# Patient Record
Sex: FEMALE | Race: ASIAN | Employment: FULL TIME | URBAN - METROPOLITAN AREA
[De-identification: names, ages, dates, MRNs, and addresses within clinical notes are randomized per-mention and may not be internally consistent; named-entity substitution may affect disease eponyms.]

---

## 2017-02-01 ENCOUNTER — OFFICE VISIT (OUTPATIENT)
Dept: INTERNAL MEDICINE CLINIC | Facility: CLINIC | Age: 32
End: 2017-02-01

## 2017-02-01 VITALS
TEMPERATURE: 99 F | DIASTOLIC BLOOD PRESSURE: 80 MMHG | SYSTOLIC BLOOD PRESSURE: 118 MMHG | WEIGHT: 240 LBS | BODY MASS INDEX: 38.57 KG/M2 | HEART RATE: 76 BPM | OXYGEN SATURATION: 98 % | HEIGHT: 66 IN

## 2017-02-01 DIAGNOSIS — J02.9 ACUTE PHARYNGITIS, UNSPECIFIED ETIOLOGY: Primary | ICD-10-CM

## 2017-02-01 PROCEDURE — 99212 OFFICE O/P EST SF 10 MIN: CPT | Performed by: INTERNAL MEDICINE

## 2017-02-01 PROCEDURE — 99213 OFFICE O/P EST LOW 20 MIN: CPT | Performed by: INTERNAL MEDICINE

## 2017-02-01 RX ORDER — AMOXICILLIN AND CLAVULANATE POTASSIUM 875; 125 MG/1; MG/1
1 TABLET, FILM COATED ORAL 2 TIMES DAILY
Qty: 10 TABLET | Refills: 0 | Status: SHIPPED | OUTPATIENT
Start: 2017-02-01 | End: 2017-02-06

## 2017-02-01 RX ORDER — IBUPROFEN 600 MG/1
600 TABLET ORAL EVERY 6 HOURS PRN
Status: ON HOLD | COMMUNITY
End: 2020-10-07

## 2017-02-01 NOTE — PATIENT INSTRUCTIONS
When You Have a Sore Throat  A sore throat can be painful. There are many reasons why you may have a sore throat. Your healthcare provider will work with you to find the cause of your sore throat. He or she will also find the best treatment for you. swelling in the neck, and may listen to your chest.  Possible tests  Other tests your healthcare provider may perform include:  · A throat swab to check for bacteria such as streptococcus (the bacteria that causes strep throat)  · A blood test to check for she thinks they are likely to help. If antibiotics are prescribed  Take the medicine exactly as directed. Be sure to finish your prescription even if you’re feeling better.  And be sure to ask your healthcare provider or pharmacist what side effects are co

## 2017-02-01 NOTE — PROGRESS NOTES
HPI:    Patient ID: Nataliya Chowdhury is a 32year old female. HPI she  came in today complaining of sore throat and trouble swallowing. According to her started on Monday and her symptoms are progressively getting worse . Initially she had only a sore thro hallucinations, behavioral problems, confusion, sleep disturbance and agitation. The patient is not nervous/anxious. Current Outpatient Prescriptions:  ibuprofen 600 MG Oral Tab Take 600 mg by mouth every 6 (six) hours as needed for Pain.  Disp: Cardiovascular: Normal rate, regular rhythm, normal heart sounds and intact distal pulses. Exam reveals no gallop and no friction rub. No murmur heard. Pulmonary/Chest: Effort normal and breath sounds normal. No accessory muscle usage.  No respirator

## 2017-08-22 ENCOUNTER — CHARTING TRANS (OUTPATIENT)
Dept: OTHER | Age: 32
End: 2017-08-22

## 2017-08-25 ENCOUNTER — CHARTING TRANS (OUTPATIENT)
Dept: OTHER | Age: 32
End: 2017-08-25

## 2020-10-04 ENCOUNTER — APPOINTMENT (OUTPATIENT)
Dept: CT IMAGING | Facility: HOSPITAL | Age: 35
End: 2020-10-04
Attending: EMERGENCY MEDICINE
Payer: COMMERCIAL

## 2020-10-04 ENCOUNTER — HOSPITAL ENCOUNTER (EMERGENCY)
Facility: HOSPITAL | Age: 35
Discharge: HOME | End: 2020-10-04
Attending: EMERGENCY MEDICINE
Payer: COMMERCIAL

## 2020-10-04 ENCOUNTER — APPOINTMENT (OUTPATIENT)
Dept: ULTRASOUND IMAGING | Facility: HOSPITAL | Age: 35
End: 2020-10-04
Attending: EMERGENCY MEDICINE
Payer: COMMERCIAL

## 2020-10-04 VITALS
SYSTOLIC BLOOD PRESSURE: 124 MMHG | DIASTOLIC BLOOD PRESSURE: 72 MMHG | HEART RATE: 73 BPM | TEMPERATURE: 98 F | RESPIRATION RATE: 18 BRPM | HEIGHT: 65 IN | WEIGHT: 238.13 LBS | OXYGEN SATURATION: 99 % | BODY MASS INDEX: 39.67 KG/M2

## 2020-10-04 DIAGNOSIS — N83.201 CYST OF RIGHT OVARY: Primary | ICD-10-CM

## 2020-10-04 PROCEDURE — 85025 COMPLETE CBC W/AUTO DIFF WBC: CPT | Performed by: EMERGENCY MEDICINE

## 2020-10-04 PROCEDURE — 76830 TRANSVAGINAL US NON-OB: CPT | Performed by: EMERGENCY MEDICINE

## 2020-10-04 PROCEDURE — 96374 THER/PROPH/DIAG INJ IV PUSH: CPT

## 2020-10-04 PROCEDURE — 96375 TX/PRO/DX INJ NEW DRUG ADDON: CPT

## 2020-10-04 PROCEDURE — 96361 HYDRATE IV INFUSION ADD-ON: CPT

## 2020-10-04 PROCEDURE — 93975 VASCULAR STUDY: CPT | Performed by: EMERGENCY MEDICINE

## 2020-10-04 PROCEDURE — 74177 CT ABD & PELVIS W/CONTRAST: CPT | Performed by: EMERGENCY MEDICINE

## 2020-10-04 PROCEDURE — 99285 EMERGENCY DEPT VISIT HI MDM: CPT

## 2020-10-04 PROCEDURE — 80048 BASIC METABOLIC PNL TOTAL CA: CPT | Performed by: EMERGENCY MEDICINE

## 2020-10-04 PROCEDURE — 76856 US EXAM PELVIC COMPLETE: CPT | Performed by: EMERGENCY MEDICINE

## 2020-10-04 PROCEDURE — 96376 TX/PRO/DX INJ SAME DRUG ADON: CPT

## 2020-10-04 PROCEDURE — 81025 URINE PREGNANCY TEST: CPT

## 2020-10-04 PROCEDURE — 81001 URINALYSIS AUTO W/SCOPE: CPT | Performed by: EMERGENCY MEDICINE

## 2020-10-04 RX ORDER — ONDANSETRON 4 MG/1
4 TABLET, ORALLY DISINTEGRATING ORAL EVERY 4 HOURS PRN
Qty: 10 TABLET | Refills: 0 | Status: ON HOLD | OUTPATIENT
Start: 2020-10-04 | End: 2020-10-07

## 2020-10-04 RX ORDER — MORPHINE SULFATE 4 MG/ML
4 INJECTION, SOLUTION INTRAMUSCULAR; INTRAVENOUS ONCE
Status: COMPLETED | OUTPATIENT
Start: 2020-10-04 | End: 2020-10-04

## 2020-10-04 RX ORDER — ONDANSETRON 2 MG/ML
4 INJECTION INTRAMUSCULAR; INTRAVENOUS ONCE
Status: COMPLETED | OUTPATIENT
Start: 2020-10-04 | End: 2020-10-04

## 2020-10-04 RX ORDER — IBUPROFEN 600 MG/1
600 TABLET ORAL EVERY 8 HOURS PRN
Qty: 30 TABLET | Refills: 0 | Status: ON HOLD | OUTPATIENT
Start: 2020-10-04 | End: 2020-10-05

## 2020-10-04 RX ORDER — HYDROCODONE BITARTRATE AND ACETAMINOPHEN 5; 325 MG/1; MG/1
1 TABLET ORAL EVERY 6 HOURS PRN
Qty: 16 TABLET | Refills: 0 | Status: ON HOLD | OUTPATIENT
Start: 2020-10-04 | End: 2020-10-07

## 2020-10-04 NOTE — ED PROVIDER NOTES
Patient Seen in: [facility] AND [facility] Emergency Department      History   Patient presents with:  Abdomen/Flank Pain    Stated Complaint: Right side pain/ Vommiting.      HPI    55-year-old female with no significant past medical history presents to the beau No chest wall tenderness  Abdominal: Moderate right lower quadrant tenderness to palpation without rebound or guarding. Nondistended. Soft. Bowel sounds are normal.   Back:   : Musculoskeletal: Normal range of motion. No deformity.    Lymphadenopathy: Finalized by (CST): Latrell Flores MD on 10/04/2020 at 1:38 PM                  MDM      Patient states that she is feeling better after her third dose of pain medicine. She does have a large right adnexal cyst that is the cause of her symptoms.   I do no

## 2020-10-04 NOTE — ED NOTES
Pt reports sharp RLQ pain x 1 day associated with chills and N/V. Pt states movement relieves pain. Denies any  complaints.

## 2020-10-05 ENCOUNTER — TELEPHONE (OUTPATIENT)
Dept: OBGYN CLINIC | Facility: CLINIC | Age: 35
End: 2020-10-05

## 2020-10-05 ENCOUNTER — OFFICE VISIT (OUTPATIENT)
Dept: OBGYN CLINIC | Facility: CLINIC | Age: 35
End: 2020-10-05
Payer: COMMERCIAL

## 2020-10-05 ENCOUNTER — HOSPITAL ENCOUNTER (OUTPATIENT)
Facility: HOSPITAL | Age: 35
Setting detail: OBSERVATION
Discharge: HOME | End: 2020-10-07
Attending: OBSTETRICS & GYNECOLOGY | Admitting: OBSTETRICS & GYNECOLOGY
Payer: COMMERCIAL

## 2020-10-05 ENCOUNTER — APPOINTMENT (OUTPATIENT)
Dept: ULTRASOUND IMAGING | Facility: HOSPITAL | Age: 35
End: 2020-10-05
Attending: NURSE PRACTITIONER
Payer: COMMERCIAL

## 2020-10-05 VITALS
WEIGHT: 261 LBS | SYSTOLIC BLOOD PRESSURE: 120 MMHG | DIASTOLIC BLOOD PRESSURE: 76 MMHG | HEART RATE: 76 BPM | BODY MASS INDEX: 43 KG/M2

## 2020-10-05 DIAGNOSIS — N83.201 CYST OF RIGHT OVARY: Primary | ICD-10-CM

## 2020-10-05 PROBLEM — J02.9 ACUTE PHARYNGITIS: Status: RESOLVED | Noted: 2017-02-01 | Resolved: 2020-10-05

## 2020-10-05 PROBLEM — R10.2 ACUTE PELVIC PAIN, FEMALE: Status: ACTIVE | Noted: 2020-10-05

## 2020-10-05 PROCEDURE — 76856 US EXAM PELVIC COMPLETE: CPT | Performed by: NURSE PRACTITIONER

## 2020-10-05 PROCEDURE — 99204 OFFICE O/P NEW MOD 45 MIN: CPT | Performed by: OBSTETRICS & GYNECOLOGY

## 2020-10-05 PROCEDURE — 3078F DIAST BP <80 MM HG: CPT | Performed by: OBSTETRICS & GYNECOLOGY

## 2020-10-05 PROCEDURE — 93975 VASCULAR STUDY: CPT | Performed by: NURSE PRACTITIONER

## 2020-10-05 PROCEDURE — 3074F SYST BP LT 130 MM HG: CPT | Performed by: OBSTETRICS & GYNECOLOGY

## 2020-10-05 PROCEDURE — 76830 TRANSVAGINAL US NON-OB: CPT | Performed by: NURSE PRACTITIONER

## 2020-10-05 RX ORDER — CEFAZOLIN SODIUM/WATER 2 G/20 ML
2 SYRINGE (ML) INTRAVENOUS ONCE
Status: COMPLETED | OUTPATIENT
Start: 2020-10-06 | End: 2020-10-06

## 2020-10-05 RX ORDER — HYDROMORPHONE HYDROCHLORIDE 1 MG/ML
0.8 INJECTION, SOLUTION INTRAMUSCULAR; INTRAVENOUS; SUBCUTANEOUS EVERY 2 HOUR PRN
Status: DISCONTINUED | OUTPATIENT
Start: 2020-10-05 | End: 2020-10-06

## 2020-10-05 RX ORDER — DEXTROSE AND SODIUM CHLORIDE 5; .45 G/100ML; G/100ML
INJECTION, SOLUTION INTRAVENOUS CONTINUOUS
Status: ACTIVE | OUTPATIENT
Start: 2020-10-05 | End: 2020-10-05

## 2020-10-05 RX ORDER — MORPHINE SULFATE 4 MG/ML
2 INJECTION, SOLUTION INTRAMUSCULAR; INTRAVENOUS ONCE
Status: COMPLETED | OUTPATIENT
Start: 2020-10-05 | End: 2020-10-05

## 2020-10-05 RX ORDER — HYDROMORPHONE HYDROCHLORIDE 1 MG/ML
0.4 INJECTION, SOLUTION INTRAMUSCULAR; INTRAVENOUS; SUBCUTANEOUS EVERY 2 HOUR PRN
Status: DISCONTINUED | OUTPATIENT
Start: 2020-10-05 | End: 2020-10-06

## 2020-10-05 RX ORDER — SODIUM CHLORIDE, SODIUM LACTATE, POTASSIUM CHLORIDE, CALCIUM CHLORIDE 600; 310; 30; 20 MG/100ML; MG/100ML; MG/100ML; MG/100ML
INJECTION, SOLUTION INTRAVENOUS CONTINUOUS
Status: DISCONTINUED | OUTPATIENT
Start: 2020-10-05 | End: 2020-10-06

## 2020-10-05 RX ORDER — MORPHINE SULFATE 4 MG/ML
4 INJECTION, SOLUTION INTRAMUSCULAR; INTRAVENOUS ONCE
Status: COMPLETED | OUTPATIENT
Start: 2020-10-05 | End: 2020-10-05

## 2020-10-05 RX ORDER — ONDANSETRON 2 MG/ML
4 INJECTION INTRAMUSCULAR; INTRAVENOUS ONCE
Status: COMPLETED | OUTPATIENT
Start: 2020-10-05 | End: 2020-10-05

## 2020-10-05 NOTE — PROGRESS NOTES
Felipa Davalos is a 28year old female [facility] Patient's last menstrual period was 10/04/2020. Patient presents with:  ER F/U: cyst on right ovary - New Pt. Has RLQ pain on Sat morning. moving helped pain & no pain meds needed.  Then woke up on Sunday with violence        Fear of current or ex partner: Not on file        Emotionally abused: Not on file        Physically abused: Not on file        Forced sexual activity: Not on file    Other Topics      Concerns:        Not on file    Social History Narrative cyanosis  Psychiatric:    oriented to time, place, person and situation.  Appropriate mood and affect    Pelvic Exam:  External Genitalia:  normal appearance, hair distribution, and no lesions  Urethral Meatus:   normal in size, location, without lesions an Doppler interrogation. Spectral Doppler analysis reveals biphasic waveforms. CUL-DE-SAC:   Small volume free pelvic fluid is noted. OTHER: Limited sonographic views of the bladder are otherwise unremarkable. CONCLUSION:  1.  No definite evidence

## 2020-10-05 NOTE — ED NOTES
Pt resting in bed with family at bedside,c/o severe abd pain, [address] notified. Pt AAOx4, respirating well on room air. Will continue to monitor.

## 2020-10-05 NOTE — ED PROVIDER NOTES
Patient Seen in: [facility] AND [facility] Emergency Department      History   Patient presents with:  Abdomen/Flank Pain    Stated Complaint: RLQ pain    HPI    77-year-old female  0 para 0 last menstrual period was  presents to the emergency de well-developed. She is obese. Cardiovascular:      Rate and Rhythm: Normal rate and regular rhythm. Pulmonary:      Effort: Pulmonary effort is normal.   Abdominal:      General: There is distension. Palpations: Abdomen is soft. Tenderness:  Gracia Mary 3. Small volume free pelvic fluid, perhaps physiologic.     Dictated by (CST): Karlie Lema MD on 10/04/2020 at 1:33 PM     Finalized by (CST): Karlie Lema MD on 10/04/2020 at 1:38 PM          Consult with Dr. Kavita Hamilton- will obtain additional US r/o t

## 2020-10-05 NOTE — TELEPHONE ENCOUNTER
Please schedule the following surgery:    Procedure: laparascopic R ovarian cystectomy, possible oophorectomy, possible laparatomy     Date: 10/7 pm or 10/8 am (10/9 pm if no other option)    Diagnosis: Large right ovarian cyst    Admission:Day surgery

## 2020-10-05 NOTE — ED NOTES
Pt resting in bed c/o lower right quadrant abd pain, pt states she was seen in ED yesterday and dx with ovarian cyst, saw her gynecologist today and was sent back to ED. HR wnl. Respirating well on room air. Skin warm, dry, intact. AAOx4. MAEW.  Will contin

## 2020-10-05 NOTE — ED INITIAL ASSESSMENT (HPI)
PATIENT AOX3 AMBULATORY TO ED PATIENT CO OF RLQ ABD PAIN X FEW DAYS DENIES FEVER DENIES URINARY SYMPTOMS +CONSTIPATION, LAST BM Sunday, PAIN 9/10

## 2020-10-06 ENCOUNTER — ANESTHESIA (OUTPATIENT)
Dept: SURGERY | Facility: HOSPITAL | Age: 35
End: 2020-10-06
Payer: COMMERCIAL

## 2020-10-06 ENCOUNTER — ANESTHESIA EVENT (OUTPATIENT)
Dept: SURGERY | Facility: HOSPITAL | Age: 35
End: 2020-10-06
Payer: COMMERCIAL

## 2020-10-06 PROCEDURE — 0UT54ZZ RESECTION OF RIGHT FALLOPIAN TUBE, PERCUTANEOUS ENDOSCOPIC APPROACH: ICD-10-PCS | Performed by: OBSTETRICS & GYNECOLOGY

## 2020-10-06 PROCEDURE — 0UT04ZZ RESECTION OF RIGHT OVARY, PERCUTANEOUS ENDOSCOPIC APPROACH: ICD-10-PCS | Performed by: OBSTETRICS & GYNECOLOGY

## 2020-10-06 RX ORDER — HYDROMORPHONE HYDROCHLORIDE 1 MG/ML
0.6 INJECTION, SOLUTION INTRAMUSCULAR; INTRAVENOUS; SUBCUTANEOUS EVERY 5 MIN PRN
Status: DISCONTINUED | OUTPATIENT
Start: 2020-10-06 | End: 2020-10-06 | Stop reason: HOSPADM

## 2020-10-06 RX ORDER — ONDANSETRON 2 MG/ML
4 INJECTION INTRAMUSCULAR; INTRAVENOUS ONCE AS NEEDED
Status: DISCONTINUED | OUTPATIENT
Start: 2020-10-06 | End: 2020-10-06 | Stop reason: HOSPADM

## 2020-10-06 RX ORDER — ROCURONIUM BROMIDE 10 MG/ML
INJECTION, SOLUTION INTRAVENOUS AS NEEDED
Status: DISCONTINUED | OUTPATIENT
Start: 2020-10-06 | End: 2020-10-06 | Stop reason: SURG

## 2020-10-06 RX ORDER — MORPHINE SULFATE 10 MG/ML
6 INJECTION, SOLUTION INTRAMUSCULAR; INTRAVENOUS EVERY 10 MIN PRN
Status: DISCONTINUED | OUTPATIENT
Start: 2020-10-06 | End: 2020-10-06 | Stop reason: HOSPADM

## 2020-10-06 RX ORDER — LIDOCAINE HYDROCHLORIDE 10 MG/ML
INJECTION, SOLUTION EPIDURAL; INFILTRATION; INTRACAUDAL; PERINEURAL AS NEEDED
Status: DISCONTINUED | OUTPATIENT
Start: 2020-10-06 | End: 2020-10-06 | Stop reason: SURG

## 2020-10-06 RX ORDER — PHENYLEPHRINE HCL 10 MG/ML
VIAL (ML) INJECTION AS NEEDED
Status: DISCONTINUED | OUTPATIENT
Start: 2020-10-06 | End: 2020-10-06 | Stop reason: SURG

## 2020-10-06 RX ORDER — ONDANSETRON 2 MG/ML
4 INJECTION INTRAMUSCULAR; INTRAVENOUS EVERY 8 HOURS PRN
Status: DISCONTINUED | OUTPATIENT
Start: 2020-10-06 | End: 2020-10-07

## 2020-10-06 RX ORDER — HALOPERIDOL 5 MG/ML
0.25 INJECTION INTRAMUSCULAR ONCE AS NEEDED
Status: DISCONTINUED | OUTPATIENT
Start: 2020-10-06 | End: 2020-10-06 | Stop reason: HOSPADM

## 2020-10-06 RX ORDER — SODIUM CHLORIDE, SODIUM LACTATE, POTASSIUM CHLORIDE, CALCIUM CHLORIDE 600; 310; 30; 20 MG/100ML; MG/100ML; MG/100ML; MG/100ML
INJECTION, SOLUTION INTRAVENOUS CONTINUOUS
Status: DISCONTINUED | OUTPATIENT
Start: 2020-10-06 | End: 2020-10-06 | Stop reason: HOSPADM

## 2020-10-06 RX ORDER — KETOROLAC TROMETHAMINE 30 MG/ML
INJECTION, SOLUTION INTRAMUSCULAR; INTRAVENOUS AS NEEDED
Status: DISCONTINUED | OUTPATIENT
Start: 2020-10-06 | End: 2020-10-06 | Stop reason: SURG

## 2020-10-06 RX ORDER — MORPHINE SULFATE 4 MG/ML
4 INJECTION, SOLUTION INTRAMUSCULAR; INTRAVENOUS EVERY 10 MIN PRN
Status: DISCONTINUED | OUTPATIENT
Start: 2020-10-06 | End: 2020-10-06 | Stop reason: HOSPADM

## 2020-10-06 RX ORDER — DEXAMETHASONE SODIUM PHOSPHATE 4 MG/ML
VIAL (ML) INJECTION AS NEEDED
Status: DISCONTINUED | OUTPATIENT
Start: 2020-10-06 | End: 2020-10-06 | Stop reason: SURG

## 2020-10-06 RX ORDER — MORPHINE SULFATE 4 MG/ML
2 INJECTION, SOLUTION INTRAMUSCULAR; INTRAVENOUS EVERY 10 MIN PRN
Status: DISCONTINUED | OUTPATIENT
Start: 2020-10-06 | End: 2020-10-06 | Stop reason: HOSPADM

## 2020-10-06 RX ORDER — IBUPROFEN 600 MG/1
600 TABLET ORAL EVERY 6 HOURS PRN
Status: DISCONTINUED | OUTPATIENT
Start: 2020-10-06 | End: 2020-10-07

## 2020-10-06 RX ORDER — NALOXONE HYDROCHLORIDE 0.4 MG/ML
80 INJECTION, SOLUTION INTRAMUSCULAR; INTRAVENOUS; SUBCUTANEOUS AS NEEDED
Status: DISCONTINUED | OUTPATIENT
Start: 2020-10-06 | End: 2020-10-06 | Stop reason: HOSPADM

## 2020-10-06 RX ORDER — SODIUM CHLORIDE, SODIUM LACTATE, POTASSIUM CHLORIDE, CALCIUM CHLORIDE 600; 310; 30; 20 MG/100ML; MG/100ML; MG/100ML; MG/100ML
INJECTION, SOLUTION INTRAVENOUS CONTINUOUS
Status: DISCONTINUED | OUTPATIENT
Start: 2020-10-06 | End: 2020-10-07

## 2020-10-06 RX ORDER — ONDANSETRON 4 MG/1
4 TABLET, FILM COATED ORAL EVERY 8 HOURS PRN
Status: DISCONTINUED | OUTPATIENT
Start: 2020-10-06 | End: 2020-10-07

## 2020-10-06 RX ORDER — HYDROCODONE BITARTRATE AND ACETAMINOPHEN 5; 325 MG/1; MG/1
1 TABLET ORAL AS NEEDED
Status: DISCONTINUED | OUTPATIENT
Start: 2020-10-06 | End: 2020-10-06 | Stop reason: HOSPADM

## 2020-10-06 RX ORDER — PROCHLORPERAZINE EDISYLATE 5 MG/ML
5 INJECTION INTRAMUSCULAR; INTRAVENOUS ONCE AS NEEDED
Status: DISCONTINUED | OUTPATIENT
Start: 2020-10-06 | End: 2020-10-06 | Stop reason: HOSPADM

## 2020-10-06 RX ORDER — ONDANSETRON 2 MG/ML
INJECTION INTRAMUSCULAR; INTRAVENOUS AS NEEDED
Status: DISCONTINUED | OUTPATIENT
Start: 2020-10-06 | End: 2020-10-06 | Stop reason: SURG

## 2020-10-06 RX ORDER — MIDAZOLAM HYDROCHLORIDE 1 MG/ML
INJECTION INTRAMUSCULAR; INTRAVENOUS AS NEEDED
Status: DISCONTINUED | OUTPATIENT
Start: 2020-10-06 | End: 2020-10-06 | Stop reason: SURG

## 2020-10-06 RX ORDER — HYDROCODONE BITARTRATE AND ACETAMINOPHEN 5; 325 MG/1; MG/1
2 TABLET ORAL AS NEEDED
Status: DISCONTINUED | OUTPATIENT
Start: 2020-10-06 | End: 2020-10-06 | Stop reason: HOSPADM

## 2020-10-06 RX ORDER — HYDROCODONE BITARTRATE AND ACETAMINOPHEN 5; 325 MG/1; MG/1
1 TABLET ORAL EVERY 4 HOURS PRN
Status: DISCONTINUED | OUTPATIENT
Start: 2020-10-06 | End: 2020-10-07

## 2020-10-06 RX ORDER — BUPIVACAINE HYDROCHLORIDE 2.5 MG/ML
INJECTION, SOLUTION EPIDURAL; INFILTRATION; INTRACAUDAL AS NEEDED
Status: DISCONTINUED | OUTPATIENT
Start: 2020-10-06 | End: 2020-10-06 | Stop reason: HOSPADM

## 2020-10-06 RX ORDER — HYDROMORPHONE HYDROCHLORIDE 1 MG/ML
0.2 INJECTION, SOLUTION INTRAMUSCULAR; INTRAVENOUS; SUBCUTANEOUS EVERY 5 MIN PRN
Status: DISCONTINUED | OUTPATIENT
Start: 2020-10-06 | End: 2020-10-06 | Stop reason: HOSPADM

## 2020-10-06 RX ORDER — HYDROMORPHONE HYDROCHLORIDE 1 MG/ML
0.4 INJECTION, SOLUTION INTRAMUSCULAR; INTRAVENOUS; SUBCUTANEOUS EVERY 5 MIN PRN
Status: DISCONTINUED | OUTPATIENT
Start: 2020-10-06 | End: 2020-10-06 | Stop reason: HOSPADM

## 2020-10-06 RX ADMIN — KETOROLAC TROMETHAMINE 30 MG: 30 INJECTION, SOLUTION INTRAMUSCULAR; INTRAVENOUS at 12:34:00

## 2020-10-06 RX ADMIN — MIDAZOLAM HYDROCHLORIDE 2 MG: 1 INJECTION INTRAMUSCULAR; INTRAVENOUS at 10:44:00

## 2020-10-06 RX ADMIN — ONDANSETRON 4 MG: 2 INJECTION INTRAMUSCULAR; INTRAVENOUS at 12:06:00

## 2020-10-06 RX ADMIN — PHENYLEPHRINE HCL 100 MCG: 10 MG/ML VIAL (ML) INJECTION at 11:04:00

## 2020-10-06 RX ADMIN — SODIUM CHLORIDE, SODIUM LACTATE, POTASSIUM CHLORIDE, CALCIUM CHLORIDE: 600; 310; 30; 20 INJECTION, SOLUTION INTRAVENOUS at 12:45:00

## 2020-10-06 RX ADMIN — DEXAMETHASONE SODIUM PHOSPHATE 4 MG: 4 MG/ML VIAL (ML) INJECTION at 11:00:00

## 2020-10-06 RX ADMIN — LIDOCAINE HYDROCHLORIDE 50 MG: 10 INJECTION, SOLUTION EPIDURAL; INFILTRATION; INTRACAUDAL; PERINEURAL at 10:49:00

## 2020-10-06 RX ADMIN — ROCURONIUM BROMIDE 50 MG: 10 INJECTION, SOLUTION INTRAVENOUS at 10:50:00

## 2020-10-06 RX ADMIN — SODIUM CHLORIDE, SODIUM LACTATE, POTASSIUM CHLORIDE, CALCIUM CHLORIDE: 600; 310; 30; 20 INJECTION, SOLUTION INTRAVENOUS at 11:33:00

## 2020-10-06 RX ADMIN — CEFAZOLIN SODIUM/WATER 2 G: 2 G/20 ML SYRINGE (ML) INTRAVENOUS at 10:53:00

## 2020-10-06 NOTE — PLAN OF CARE
Problem: Patient Centered Care  Goal: Patient preferences are identified and integrated in the patient's plan of care  Description: Interventions:  - Provide timely, complete, and accurate information to patient/family  - Incorporate patient and family k ordered  - Implement neutropenic guidelines  Outcome: Progressing     Problem: SAFETY ADULT - FALL  Goal: Free from fall injury  Description: INTERVENTIONS:  - Assess pt frequently for physical needs  - Identify cognitive and physical deficits and behavior Minimize distractions  - Allow time for understanding and response  - Establish method for patient to ask for assistance (call light)  - Provide an  as needed  - Communicate barriers and strategies to overcome with those who interact with patien

## 2020-10-06 NOTE — ANESTHESIA PREPROCEDURE EVALUATION
Anesthesia PreOp Note    HPI:     Cathy Regalado is a 28year old female who presents for preoperative consultation requested by: Yani Marcial DO    Date of Surgery: 10/5/2020 - 10/6/2020    Procedure(s):  LAPAROSCOPIC OVARIAN CYSTECTOMY  Indication: Socioeconomic History      Marital status: Single      Spouse name: Not on file      Number of children: Not on file      Years of education: Not on file      Highest education level: Not on file    Occupational History      Not on file    Social Needs 10/05/2020    CA 8.8 10/05/2020          Vital Signs: Body mass index is 41.45 kg/m². height is 1.676 m (5' 6\") and weight is 116.5 kg (256 lb 12.8 oz). Her oral temperature is 100.1 °F (37.8 °C). Her blood pressure is 135/79 and her pulse is 90.  Her r

## 2020-10-06 NOTE — ANESTHESIA POSTPROCEDURE EVALUATION
Patient: Rashad Mckeon    Procedure Summary     Date: 10/06/20 Room / Location: [facility] OR 03 / [facility] OR    Anesthesia Start: 2153 Anesthesia Stop: 1509    Procedure: LAPAROSCOPIC OVARIAN CYSTECTOMY (Right Uterus) Diagnosis:       Cyst of right ovary

## 2020-10-06 NOTE — H&P
[facility]    History and Physical    Gutierrez Perez Patient Status:  Emergency    1985 MRN [id number]   Location [address] Attending No att. providers found   [facility] Day # 0 PCP None Pcp     Date:  10/5/2 pain  Genitourinary:                  denies dysuria, incontinence, abnormal vaginal discharge, vaginal itching  Musculoskeletal:             denies back pain. Skin/Breast:                     denies any breast pain, lumps, or discharge.    Neurological: Only)(cpt=74177)    Result Date: 10/4/2020  CONCLUSION:  1. There is a large cystic lesion of the lower abdomen/pelvis, perhaps arising from the right ovary or paraovarian in etiology. This measures up to 14.4 cm.  Given the size of this lesion, consider fo right ovary        Acute pelvic pain, female      PLAN: Laparoscopy with right ovarian cystectomy, possible right salpingo-oophorectomy, possible laparotomy. Procedure planned at 21  tomorrow unless her pain acutely worsens again. Felipe MARTINEZ  [address]

## 2020-10-06 NOTE — PROGRESS NOTES
[facility]    OB/GYNE Progress Note      Karen Pavon Patient Status:  Observation    1985 MRN [id number]   Location [facility] 4W/SW/SE Attending [address] [address] Day # 0 PCP None Pcp        Assessment/Plan Results   Component Value Date    ABO O 10/05/2020    RH Positive 10/05/2020    WBC 10.9 10/05/2020    HGB 10.5 (L) 10/05/2020    HCT 33.2 (L) 10/05/2020    .0 10/05/2020    CREATSERUM 0.91 10/05/2020    BUN 7 10/05/2020     10/05/2020    K 3.

## 2020-10-06 NOTE — ED NOTES
Orders for admission, patient is aware of plan and ready to go upstairs. Any questions, please call ED RN Jhon Dumont  at [address].    Type of COVID test sent: Rapid  COVID Suspicion level: Low    LOC at time of transport: AAOx4    Other pertinent informa

## 2020-10-06 NOTE — ED NOTES
Pt resting in bed with family at bedside, AAOx4, respirating well on room air. Pt denies complaint. Will continue to monitor.

## 2020-10-06 NOTE — ED NOTES
Pt resting in bed with family at bedside, reports improvement in pain from earlier, denies pain at this time. Pt respirating well on room air, AAOx4. Will continue to monitor.

## 2020-10-06 NOTE — PLAN OF CARE
Baljeet Dotson was received from the ED. She is A&O x4, and she is on room air. She is walking independently to the bathroom and voiding. She is NPO in prep for surgery today 10/6 at 1030 with  [facility] Her abdominal pain has been controlled with Dilaudid.  She h techniques  - Monitor for opioid side effects  - Notify MD/LIP if interventions unsuccessful or patient reports new pain  - Anticipate increased pain with activity and pre-medicate as appropriate  Outcome: Progressing     Problem: RISK FOR INFECTION - ADUL or social support system  Outcome: Progressing     Problem: Altered Communication/Language Barrier  Goal: Patient/Family is able to understand and participate in their care  Description: Interventions:  - Assess communication ability and preferred communic

## 2020-10-06 NOTE — ANESTHESIA PROCEDURE NOTES
Airway  Date/Time: 10/6/2020 10:52 AM  Urgency: Elective    Airway not difficult    General Information and Staff    Patient location during procedure: OR  Anesthesiologist: Anaid Collins MD  Resident/CRNA: True Jean CRNA  Performed: MAGALIS

## 2020-10-06 NOTE — INTERVAL H&P NOTE
Pre-op Diagnosis: Cyst of right ovary [N83.201]    The above referenced H&P was reviewed by Jessica Ramos. [address] [place] [address] on 10/6/2020, the patient was examined and no significant changes have occurred in the patient's condition since the H&P was performed.   I disc Patient/Family

## 2020-10-07 VITALS
BODY MASS INDEX: 41.27 KG/M2 | WEIGHT: 256.81 LBS | SYSTOLIC BLOOD PRESSURE: 113 MMHG | OXYGEN SATURATION: 97 % | RESPIRATION RATE: 18 BRPM | TEMPERATURE: 99 F | HEART RATE: 76 BPM | DIASTOLIC BLOOD PRESSURE: 72 MMHG | HEIGHT: 66 IN

## 2020-10-07 PROBLEM — N83.53 TORSION OF OVARY, OVARIAN PEDICLE AND FALLOPIAN TUBE: Status: ACTIVE | Noted: 2020-10-07

## 2020-10-07 PROCEDURE — 58661 LAPAROSCOPY REMOVE ADNEXA: CPT | Performed by: OBSTETRICS & GYNECOLOGY

## 2020-10-07 RX ORDER — IBUPROFEN 600 MG/1
600 TABLET ORAL EVERY 6 HOURS PRN
Qty: 20 TABLET | Refills: 0 | Status: SHIPPED | OUTPATIENT
Start: 2020-10-07 | End: 2020-10-21

## 2020-10-07 RX ORDER — HYDROCODONE BITARTRATE AND ACETAMINOPHEN 5; 325 MG/1; MG/1
1 TABLET ORAL EVERY 6 HOURS PRN
Qty: 15 TABLET | Refills: 0 | Status: SHIPPED | OUTPATIENT
Start: 2020-10-07 | End: 2020-10-21

## 2020-10-07 NOTE — PROGRESS NOTES
[facility] - [facility]    OB/GYNE Progress Note      Juan Pablo Lowe Patient Status:  Observation    1985 MRN [id number]   Location [facility] 4W/SW/SE Attending [address] [address] Day # 0 PCP None Pcp     LATE ENTRY FROM 1719 EX PROUR Negative 10/05/2020    GLUUR Negative 10/05/2020    KETUR Negative 10/05/2020    BILUR Negative 10/05/2020    BLOODURINE Negative 10/05/2020    NITRITE Negative 10/05/2020    UROBILINOGEN <2.0 10/05/2020    LEUUR Negative 10/05/2020       No results

## 2020-10-07 NOTE — BRIEF OP NOTE
Pre-Operative Diagnosis: Cyst of right ovary [N83.201] , Acute Pelvic pain     Post-Operative Diagnosis: Cyst of right ovary [N83.201], OVARIAN/TUBAL TORSION      Procedure Performed:   Procedure(s):  LAPAROSCOPY, RIGHT OVARIAN CYSTOTOMY FOLLOWED BY RIGHT

## 2020-10-07 NOTE — DISCHARGE SUMMARY
[facility] - [facility]  Discharge Summary    José Mejía Patient Status:  Observation    1985 MRN [id number]   Location [facility] 4W/SW/SE Attending Hermilo Jesus, [facility] [place] Brooke Day # 0 PCP None Pcp           Date of Admission: 10/5/2

## 2020-10-07 NOTE — OPERATIVE REPORT
[facility]    PATIENT'S NAME: Karen Bay   ATTENDING PHYSICIAN: Felipe Corado DO   OPERATING PHYSICIAN: Edwin Corado DO   PATIENT ACCOUNT#:   [de-identified]    LOCATION:  [address] [place] [address] RECORD #:   [id number]       DATE OF SANDRA cavity. We then completed insufflation through this port. Once this was accomplished, we were able to visualize the pelvis and abdomen. I placed the first accessory port in the left mid quadrant under direct visualization.   This was a 5 mm port also usi seen at the operative site. Once this was accomplished, all instruments were removed including the caps to the 3 remaining 5 mm trocars. I then performed all skin closure with subcuticular sutures of 4-0 Monocryl.   The three 5 mm incisions were covered w

## 2020-10-07 NOTE — PLAN OF CARE
Pt alert and oriented. Weaned to room air. Pt voiding. Tolerating diet with no complaints of nausea/vomiting. Saline locked. 4 lap sited C/D/I. Up independently. Pain control with Norco as needed. Most likely discharging home today once cleared.  Will tripp Assess pt frequently for physical needs  - Identify cognitive and physical deficits and behaviors that affect risk of falls.   - [place] fall precautions as indicated by assessment.  - Educate pt/family on patient safety including physical limitations  - as needed  - Communicate barriers and strategies to overcome with those who interact with patient  Outcome: Progressing

## 2020-10-19 ENCOUNTER — TELEPHONE (OUTPATIENT)
Dept: OBGYN CLINIC | Facility: CLINIC | Age: 35
End: 2020-10-19

## 2020-10-19 NOTE — TELEPHONE ENCOUNTER
Received a form to be filled out for Medical Information. Placed at  for them to give to Form's Dept.

## 2020-10-21 ENCOUNTER — OFFICE VISIT (OUTPATIENT)
Dept: OBGYN CLINIC | Facility: CLINIC | Age: 35
End: 2020-10-21
Payer: COMMERCIAL

## 2020-10-21 VITALS
DIASTOLIC BLOOD PRESSURE: 76 MMHG | HEART RATE: 97 BPM | WEIGHT: 258 LBS | BODY MASS INDEX: 42 KG/M2 | SYSTOLIC BLOOD PRESSURE: 112 MMHG

## 2020-10-21 DIAGNOSIS — Z98.890 POST-OPERATIVE STATE: Primary | ICD-10-CM

## 2020-10-21 PROCEDURE — 3078F DIAST BP <80 MM HG: CPT | Performed by: OBSTETRICS & GYNECOLOGY

## 2020-10-21 PROCEDURE — 99024 POSTOP FOLLOW-UP VISIT: CPT | Performed by: OBSTETRICS & GYNECOLOGY

## 2020-10-21 PROCEDURE — 3074F SYST BP LT 130 MM HG: CPT | Performed by: OBSTETRICS & GYNECOLOGY

## 2020-10-21 PROCEDURE — 1111F DSCHRG MED/CURRENT MED MERGE: CPT | Performed by: OBSTETRICS & GYNECOLOGY

## 2020-10-22 ENCOUNTER — TELEPHONE (OUTPATIENT)
Dept: OBGYN CLINIC | Facility: CLINIC | Age: 35
End: 2020-10-22

## 2020-10-22 NOTE — TELEPHONE ENCOUNTER
[facility] disability form received from dept. NO HIPPA ,no fees paid . Logged for processing

## 2020-10-27 PROBLEM — R10.2 ACUTE PELVIC PAIN, FEMALE: Status: RESOLVED | Noted: 2020-10-05 | Resolved: 2020-10-27

## 2020-10-28 NOTE — PROGRESS NOTES
POST OP EXAM: No complaints. She denies pain, bowel, bladder or bleeding issues. PE: Incisions healing well with scant Dermabond present. Abdomen non-tender    IMP: Normal post op    PLAN: Annual exam in 3 months.

## 2020-11-03 ENCOUNTER — TELEPHONE (OUTPATIENT)
Dept: OBGYN CLINIC | Facility: CLINIC | Age: 35
End: 2020-11-03

## 2020-11-03 NOTE — TELEPHONE ENCOUNTER
Received forms for medical information questionnaire from [facility]. Placed at  to send to Forms Department.

## 2020-11-11 NOTE — TELEPHONE ENCOUNTER
[id number] [place] [address],     Please sign off on form: Short term disability - 10/6/20 - 11/8/20  -Highlight the patient and hit \"Chart\" button.   -In Chart Review, w/in the Encounter tab - click 1 time on the Telephone call encounter for 10/19/20 Scroll down the tele

## 2020-11-16 NOTE — TELEPHONE ENCOUNTER
Dr. Mendez,     Please sign off on form: Short term disability - 10/6/20 - 11/8/20  -Highlight the patient and hit \"Chart\" button.   -In Chart Review, w/in the Encounter tab - click 1 time on the Telephone call encounter for 10/19/20 Scroll down the tele

## 2020-11-18 NOTE — TELEPHONE ENCOUNTER
Disab completed and faxed to [facility] [phone].  Sent pt [employer] message

## 2021-04-01 DIAGNOSIS — Z23 NEED FOR VACCINATION: ICD-10-CM

## 2022-02-21 ENCOUNTER — OFFICE VISIT (OUTPATIENT)
Dept: OBGYN CLINIC | Facility: CLINIC | Age: 37
End: 2022-02-21
Payer: COMMERCIAL

## 2022-02-21 ENCOUNTER — LAB ENCOUNTER (OUTPATIENT)
Dept: LAB | Facility: HOSPITAL | Age: 37
End: 2022-02-21
Attending: OBSTETRICS & GYNECOLOGY
Payer: COMMERCIAL

## 2022-02-21 VITALS
WEIGHT: 258.38 LBS | DIASTOLIC BLOOD PRESSURE: 76 MMHG | HEART RATE: 88 BPM | SYSTOLIC BLOOD PRESSURE: 117 MMHG | BODY MASS INDEX: 42 KG/M2

## 2022-02-21 DIAGNOSIS — Z11.3 SCREEN FOR STD (SEXUALLY TRANSMITTED DISEASE): ICD-10-CM

## 2022-02-21 DIAGNOSIS — Z30.09 BIRTH CONTROL COUNSELING: ICD-10-CM

## 2022-02-21 DIAGNOSIS — Z01.419 ENCOUNTER FOR GYNECOLOGICAL EXAMINATION WITHOUT ABNORMAL FINDING: Primary | ICD-10-CM

## 2022-02-21 PROBLEM — N83.201 CYST OF RIGHT OVARY: Status: RESOLVED | Noted: 2020-10-05 | Resolved: 2022-02-21

## 2022-02-21 PROBLEM — N83.53 TORSION OF OVARY, OVARIAN PEDICLE AND FALLOPIAN TUBE: Status: RESOLVED | Noted: 2020-10-07 | Resolved: 2022-02-21

## 2022-02-21 LAB
HBV SURFACE AG SER-ACNC: <0.1 [IU]/L
HBV SURFACE AG SERPL QL IA: NONREACTIVE
HCV AB SERPL QL IA: NONREACTIVE

## 2022-02-21 PROCEDURE — 3078F DIAST BP <80 MM HG: CPT | Performed by: OBSTETRICS & GYNECOLOGY

## 2022-02-21 PROCEDURE — 3074F SYST BP LT 130 MM HG: CPT | Performed by: OBSTETRICS & GYNECOLOGY

## 2022-02-21 PROCEDURE — 87340 HEPATITIS B SURFACE AG IA: CPT

## 2022-02-21 PROCEDURE — 99395 PREV VISIT EST AGE 18-39: CPT | Performed by: OBSTETRICS & GYNECOLOGY

## 2022-02-21 PROCEDURE — 87389 HIV-1 AG W/HIV-1&-2 AB AG IA: CPT

## 2022-02-21 PROCEDURE — 36415 COLL VENOUS BLD VENIPUNCTURE: CPT

## 2022-02-21 PROCEDURE — 86803 HEPATITIS C AB TEST: CPT

## 2022-02-21 PROCEDURE — 86780 TREPONEMA PALLIDUM: CPT

## 2022-02-21 RX ORDER — MISOPROSTOL 200 UG/1
TABLET ORAL
Qty: 2 TABLET | Refills: 0 | Status: SHIPPED | OUTPATIENT
Start: 2022-02-21

## 2022-02-22 LAB
C TRACH DNA SPEC QL NAA+PROBE: NEGATIVE
HPV I/H RISK 1 DNA SPEC QL NAA+PROBE: NEGATIVE
N GONORRHOEA DNA SPEC QL NAA+PROBE: NEGATIVE

## 2022-02-23 LAB
T PALLIDUM AB SER QL: NEGATIVE
T VAGINALIS RRNA SPEC QL NAA+PROBE: NEGATIVE

## 2023-08-03 ENCOUNTER — OFFICE VISIT (OUTPATIENT)
Dept: INTERNAL MEDICINE CLINIC | Facility: CLINIC | Age: 38
End: 2023-08-03

## 2023-08-03 VITALS
OXYGEN SATURATION: 99 % | HEIGHT: 66 IN | DIASTOLIC BLOOD PRESSURE: 70 MMHG | BODY MASS INDEX: 40.98 KG/M2 | HEART RATE: 78 BPM | WEIGHT: 255 LBS | TEMPERATURE: 98 F | RESPIRATION RATE: 18 BRPM | SYSTOLIC BLOOD PRESSURE: 118 MMHG

## 2023-08-03 DIAGNOSIS — Z00.00 ANNUAL PHYSICAL EXAM: Primary | ICD-10-CM

## 2023-08-03 PROCEDURE — 99385 PREV VISIT NEW AGE 18-39: CPT | Performed by: INTERNAL MEDICINE

## 2023-08-03 PROCEDURE — 3074F SYST BP LT 130 MM HG: CPT | Performed by: INTERNAL MEDICINE

## 2023-08-03 PROCEDURE — 3078F DIAST BP <80 MM HG: CPT | Performed by: INTERNAL MEDICINE

## 2023-08-03 PROCEDURE — 90715 TDAP VACCINE 7 YRS/> IM: CPT | Performed by: INTERNAL MEDICINE

## 2023-08-03 PROCEDURE — 3008F BODY MASS INDEX DOCD: CPT | Performed by: INTERNAL MEDICINE

## 2023-08-03 PROCEDURE — 90471 IMMUNIZATION ADMIN: CPT | Performed by: INTERNAL MEDICINE

## 2023-08-03 NOTE — PROGRESS NOTES
Subjective:     Patient ID: Zayra Mead is a 45year old female. HPI  Patient comes in today first time for annual physical denies any complaints she is a teacher in middle school. History/Other:   Review of Systems   Constitutional: Negative. HENT: Negative. Eyes: Negative. Respiratory: Negative. Cardiovascular: Negative. Gastrointestinal: Negative. Genitourinary: Negative. Musculoskeletal: Negative. Skin: Negative. Neurological: Negative. Psychiatric/Behavioral: Negative. No current outpatient medications on file. Allergies:No Known Allergies    No past medical history on file. Past Surgical History:   Procedure Laterality Date    OOPHORECTOMY  10/05/2020    laparascopic RSO for torsed large ov cyst      Family History   Problem Relation Age of Onset    Diabetes Father       Social History:   Social History     Socioeconomic History    Marital status: Single   Tobacco Use    Smoking status: Never    Smokeless tobacco: Never   Substance and Sexual Activity    Alcohol use: Yes     Comment: 2 drinks a week     Drug use: No        Objective:   Physical Exam  Vitals and nursing note reviewed. Constitutional:       Appearance: She is well-developed. HENT:      Head: Normocephalic and atraumatic. Right Ear: External ear normal.      Left Ear: External ear normal.      Nose: Nose normal.   Eyes:      Conjunctiva/sclera: Conjunctivae normal.      Pupils: Pupils are equal, round, and reactive to light. Cardiovascular:      Rate and Rhythm: Normal rate and regular rhythm. Heart sounds: Normal heart sounds. Pulmonary:      Effort: Pulmonary effort is normal.      Breath sounds: Normal breath sounds. Abdominal:      General: Bowel sounds are normal.      Palpations: Abdomen is soft. Genitourinary:     Vagina: Normal.   Musculoskeletal:         General: Normal range of motion. Cervical back: Normal range of motion and neck supple.    Skin: General: Skin is warm and dry. Neurological:      Mental Status: She is alert and oriented to person, place, and time. Deep Tendon Reflexes: Reflexes are normal and symmetric. Psychiatric:         Behavior: Behavior normal.         Thought Content:  Thought content normal.         Judgment: Judgment normal.         Assessment & Plan:   Annual physical exam  (primary encounter diagnosis) exam is okay will order labs follow results  Patient to follow-up with her OB/GYN on the ovarian cyst and need for Pap    Orders Placed This Encounter      CBC With Differential With Platelet      Comp Metabolic Panel (14)      TSH W Reflex To Free T4      Lipid Panel      Urinalysis, Routine      TETANUS, DIPHTHERIA TOXOIDS AND ACELLULAR PERTUSIS VACCINE (TDAP), >7 YEARS, IM USE      Meds This Visit:  Requested Prescriptions      No prescriptions requested or ordered in this encounter       Imaging & Referrals:  TETANUS, DIPHTHERIA TOXOIDS AND ACELLULAR PERTUSIS VACCINE (TDAP), >7 YEARS, IM USE  EKG 12-LEAD

## 2024-01-11 ENCOUNTER — OFFICE VISIT (OUTPATIENT)
Dept: SURGERY | Facility: CLINIC | Age: 39
End: 2024-01-11
Payer: COMMERCIAL

## 2024-01-11 VITALS
DIASTOLIC BLOOD PRESSURE: 80 MMHG | BODY MASS INDEX: 38.91 KG/M2 | HEIGHT: 66.5 IN | WEIGHT: 245 LBS | HEART RATE: 96 BPM | SYSTOLIC BLOOD PRESSURE: 130 MMHG | OXYGEN SATURATION: 95 %

## 2024-01-11 DIAGNOSIS — R06.83 SNORING: ICD-10-CM

## 2024-01-11 DIAGNOSIS — E66.9 OBESITY (BMI 30-39.9): ICD-10-CM

## 2024-01-11 DIAGNOSIS — Z86.2 HISTORY OF ANEMIA: ICD-10-CM

## 2024-01-11 DIAGNOSIS — Z51.81 ENCOUNTER FOR THERAPEUTIC DRUG MONITORING: Primary | ICD-10-CM

## 2024-01-11 DIAGNOSIS — G47.30 SLEEP APNEA, UNSPECIFIED TYPE: ICD-10-CM

## 2024-01-11 PROCEDURE — 3075F SYST BP GE 130 - 139MM HG: CPT | Performed by: NURSE PRACTITIONER

## 2024-01-11 PROCEDURE — 99204 OFFICE O/P NEW MOD 45 MIN: CPT | Performed by: NURSE PRACTITIONER

## 2024-01-11 PROCEDURE — 3079F DIAST BP 80-89 MM HG: CPT | Performed by: NURSE PRACTITIONER

## 2024-01-11 PROCEDURE — 3008F BODY MASS INDEX DOCD: CPT | Performed by: NURSE PRACTITIONER

## 2024-01-11 RX ORDER — TIRZEPATIDE 2.5 MG/.5ML
2.5 INJECTION, SOLUTION SUBCUTANEOUS WEEKLY
Qty: 2 ML | Refills: 0 | Status: SHIPPED | OUTPATIENT
Start: 2024-01-11

## 2024-01-11 NOTE — PROGRESS NOTES
[facility] Consultation Note       Date of Consult:  2024    Patient:  Meli Sheridan  :      1985  MRN:      [id number]    Referring Provider: N/A; patient of clinic referred patient     Chief Complaint:    Chief Complaint   Patient presents with    Consult    Weight Management    Obesity       SUBJECTIVE     History of Present Illness:  Meli Sheridan has been referred to me for evaluation and treatment.       39 yo female.  Presents to clinic for assistance with weight loss/maintenance.   Reports slow/steady weight gain over the past 5 years.   Recently lost 20 lbs with lifestyle modifications.     Patient is considering medications and is not considering bariatric surgery for weight loss.    Patient denies any history of eating disorder(s).    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    Patient's goal weight: healthy   Biggest weight loss in the past: 20 lbs  How weight loss was achieved: lifestyle changes  Heaviest weight ever: 265 lbs   Previous use of medical weight loss medications: None     Physical activity: Crossfit 2-3 days/week    Sleep: interrupted hours/night    Sleep screening: +gasping, choking, snoring    Past Medical History: History reviewed. No pertinent past medical history.    OBJECTIVE     Vitals: /80 (BP Location: Right arm, Patient Position: Sitting, Cuff Size: adult)   Pulse 96   Ht 5' 6.5\" (1.689 m)   Wt 245 lb (111.1 kg)   SpO2 95%   BMI 38.95 kg/m²        Wt Readings from Last 6 Encounters:   24 245 lb (111.1 kg)   23 255 lb (115.7 kg)   22 258 lb 6.4 oz (117.2 kg)   10/21/20 258 lb (117 kg)   10/05/20 256 lb 12.8 oz (116.5 kg)   10/05/20 261 lb (118.4 kg)        Patient Medications:    Current Outpatient Medications   Medication Sig Dispense Refill    Tirzepatide-Weight Management (ZEPBOUND) 2.5 MG/0.5ML Subcutaneous Solution Auto-injector Inject 2.5 mg into the skin once a week. 2 mL 0       Allergies:   Patient has no known allergies.     Comorbidities:      Social History:  Reviewed     Surgical History:    Past Surgical History:   Procedure Laterality Date    OOPHORECTOMY  10/05/2020    laparascopic RSO for torsed large ov cyst       Family History:    Family History   Problem Relation Age of Onset    Diabetes Father        Typical Dietary Intake:  Breakfast AM Snack Lunch PM Snack Dinner   Egg whites or eggs  Avocado  Spinach   Mushrooms    Brown rice  Chicken   Oreo  FF  Air fried, microwave foods      After dinner behavior: + ice cream bar, popcorn   Night eating: -  Portion sizes: -  Binge: -  Emotional: -  Depression: Score: 6  Grazing: +  Sweet tooth: +  Crunchy/salty: +  Etoh:  Drinks mostly water, macha tea, green tea   Soda Drinker: Yes  If yes, how much?:  with FF   Sports Drinks:  No    Juice:  No      Number of restaurant or fast food meals/week:  2 meals/week    Nutritional Goals Reviewed and Discussed:     Eat 3-4 cups of fresh fruit or vegetables daily    Behavior Modifications Reviewed and Discussed:    Eat breakfast, Eat 3 meals per day, Plan meals in advance, Read nutrition labels, Drink 64oz of water per day, Maintain a daily food journal, Utilize portion control strategies to reduce calorie intake, Identify triggers for eating and manage cues, and Eat slowly and take 20 to 30 minutes to complete each meal      ROS:  Constitutional: negative  Respiratory: negative  Cardiovascular: negative  Gastrointestinal: negative  Integument/breast: negative  Hematologic/lymphatic: negative  Musculoskeletal:negative  Neurological: positive for headaches  Behavioral/Psych: negative  Endocrine: negative    Physical Exam:  General appearance: alert, appears stated age, cooperative and obese  Head: Normocephalic, without obvious abnormality, atraumatic  Neck: no adenopathy, no carotid bruit, no JVD, supple, symmetrical, trachea midline and thyroid not enlarged, symmetric, no tenderness/mass/nodules  Lungs:  clear to auscultation bilaterally  Heart: S1, S2 normal, no murmur, click, rub or gallop, regular rate and rhythm  Abdomen: soft, non-tender; bowel sounds normal; no masses,  no organomegaly and abdomen obese   Extremities: intact, no edema   Pulses: 2+ and symmetric  Skin: intact   Neurologic: Grossly normal      ASSESSMENT       Encounter Diagnosis(ses):   1. Encounter for therapeutic drug monitoring    2. Obesity (BMI 30-39.9)    3. History of anemia    4. Snoring    5. Sleep apnea, unspecified type        PLAN         Diagnoses and all orders for this visit:    Encounter for therapeutic drug monitoring  -     EKG 12 Lead; Future    Obesity (BMI 30-39.9)  -     Comp Metabolic Panel (14); Future  -     Hemoglobin A1C; Future  -     CBC With Differential With Platelet; Future  -     Vitamin B12; Future  -     Vitamin D; Future  -     TSH and Free T4; Future  -     Leptin, Serum; Future  -     Lipid Panel; Future  -     Insulin [E]; Future  -     Pulmonary Referral - In Network  -     Tirzepatide-Weight Management (ZEPBOUND) 2.5 MG/0.5ML Subcutaneous Solution Auto-injector; Inject 2.5 mg into the skin once a week.  -     EKG 12 Lead; Future    History of anemia  -     Comp Metabolic Panel (14); Future  -     Hemoglobin A1C; Future  -     CBC With Differential With Platelet; Future  -     Vitamin B12; Future  -     Vitamin D; Future  -     TSH and Free T4; Future  -     Leptin, Serum; Future  -     Lipid Panel; Future  -     Insulin [E]; Future  -     Pulmonary Referral - In Network  -     EKG 12 Lead; Future    Snoring  -     Pulmonary Referral - In Network  -     EKG 12 Lead; Future    Sleep apnea, unspecified type        OBESITY/WEIGHT GAIN:    Recommended intensive lifestyle and behavioral modifications at this time for weight loss.    Educated patient on lifestyle modifications: Mediterranean/Whole Food/Plant Strong/Low Glycemic Index diet, moderate alcohol consumption, reduced sodium intake to no more than  2,400 mg/day, and at least 150 minutes of moderate physical activity per week.   Avoid processed, poor quality carbohydrates, refined grains, flour, sugar.    Goals for next month:  1. Keep a food log.  2. Drink 64 ounces of non-caloric beverages per day. No fruit juices or regular soda.  3. Aim for 150 minutes moderate exercise per week.    4. Increase fruit and vegetable servings to 5-6 per day.    5. Improve sleep and stress.     Reviewed labs: Update fasting labs at this time.     Discussed medication options for weight loss in detail with patient.     Denies personal or family hx medullary thyroid CA, endocrine neoplasia syndrome, pancreatitis hx, suicidal ideation. No renal impairment, severe GI disease, diabetes, pancreatitis risks noted.    Start Zepbound 2.5 mg weekly. Increase dose monthly as tolerated.    Alternative medication: Qsymia     SQ administration teaching provided to patient.   Discussed risks, benefits, and side effects of medication.    Needs EKG for stimulant.    Recommend sleep consult.    Healthy Plate Method.   Aim for 88 grams protein/day.  Other lifestyle modifications as stated in patient instructions.     I spent 45 minutes of face to face time with patient, 30 minutes of which were spent on nutrition, exercise, sleep, stress, weight loss/behavioral counseling and care coordination.    RTC one month.     SUSHILA Law

## 2024-01-11 NOTE — PATIENT INSTRUCTIONS
Zepbound schedule:    2.5 mg once a week for 4 consecutive weeks, then may increase  5 mg once a week for 4 consecutive weeks, then may increase  7.5 mg once a week for 4 consecutive weeks, then may increase  10 mg once a week for 4 consecutive weeks, then may increase  12.5 mg once a week for 4 consecutive weeks, then may increase  15 mg once a week- final dose     Each month, please message me to let me know how you are doing. I can either refill the medication to a higher dose as stated above, or you will have refills at the current/same dose until you work through the side effects which are typically nausea, vomiting, GI upset, heart burn, constipation, loose stool, and/or fatigue.     Sleep study.    Consider Zepbound.    Alternative medicine: Qsymia.     Aim for 88 grams protein/day.    25 grams/meal.     3 PM nuts/seeds.   Eat within 1-3 hours upon waking.   Meals between 7 or 9 AM and 7 PM.   6 days on, 1 day off.   Cronometer for tracking.  Add psyllium husk daily. [employer].   Build up to 35-40 grams of fiber/day.   Aim for 64 oz of water/day.    Aim for a total of:  2 fruits a day (avocado, tomato, citrus/oranges, apples, berries)  1/2 cup or medium size    4 non-starchy vegetables/day (1/2 cup cooked; 1 cup raw) (greens, peppers, onions, garlic, broccoli, cauliflower, brussels sprouts, asparagus, etc.)    0-2 starches/day (oatmeal, sweet potato, carrots, brown rice, etc).    3 protein per day (fish, seafood, meat, or plants: salmon, nuts, seeds, shrimp, chicken, turkey, beans, lentils, chickpeas, etc).    2 healthy fats (avocado, avocado oil, olives, olive oil, salmon, nuts, seeds)    I recommend a whole food, plant powered diet with low glycemic index:     Aim for 3 meals a day and 1-2 snacks as needed.    Aim for a protein + produce at each meal time.     Breakfast ideas:  1. Fruit and nuts/seeds.  2. Eggs scrambled with vegetables.  3. Oatmeal (stovetop), cinnamon, 2 tbsp flaxseed, berries,  nuts.  4. Protein shake + fruit. (1 scoop with water/ice). Garden of Life Fit, Nutiva, Bailey, and Orgain are good brands.      Snacks:  Raw vegetables and hummus, apples and peanut butter, nuts, seeds, fruit, pecans drizzled with organic honey.      Use the Healthy Plate method for lunch and dinner:  1/2 right side of plate non-starchy vegetables.  Bottom left 1/4 plate protein.  Top left 1/4 starch as desired.      Aim for 150 minutes moderate level exercise weekly with 2-3 days strength training.    Add Magnesium glycinate 200 to 500 mg/day for sleep.   Life Extension. NOW. Garden of Life. Whole Food Brand. Chadd's. Pure Encapsulations.     Or consider Natural Calm.   by Natural Vitality  Follow dosage instructions.  Start 1 teaspoon and increase up to 3 teaspoons as needed for sleep.

## 2024-01-15 ENCOUNTER — LAB ENCOUNTER (OUTPATIENT)
Dept: LAB | Age: 39
End: 2024-01-15
Attending: NURSE PRACTITIONER
Payer: COMMERCIAL

## 2024-01-15 DIAGNOSIS — R71.8 LOW MEAN CORPUSCULAR VOLUME (MCV): ICD-10-CM

## 2024-01-15 DIAGNOSIS — D64.9 ANEMIA, UNSPECIFIED TYPE: ICD-10-CM

## 2024-01-15 DIAGNOSIS — Z86.2 HISTORY OF ANEMIA: ICD-10-CM

## 2024-01-15 DIAGNOSIS — R77.8 ELEVATED TOTAL PROTEIN: ICD-10-CM

## 2024-01-15 DIAGNOSIS — Z00.00 ANNUAL PHYSICAL EXAM: ICD-10-CM

## 2024-01-15 DIAGNOSIS — E66.9 OBESITY (BMI 30-39.9): ICD-10-CM

## 2024-01-15 LAB
ALBUMIN SERPL-MCNC: 4.4 G/DL (ref 3.2–4.8)
ALBUMIN/GLOB SERPL: 1.1 {RATIO} (ref 1–2)
ALP LIVER SERPL-CCNC: 53 U/L
ALT SERPL-CCNC: 26 U/L
ANION GAP SERPL CALC-SCNC: 1 MMOL/L (ref 0–18)
AST SERPL-CCNC: 23 U/L (ref ?–34)
ATRIAL RATE: 67 BPM
BASOPHILS # BLD AUTO: 0.05 X10(3) UL (ref 0–0.2)
BASOPHILS NFR BLD AUTO: 0.7 %
BILIRUB SERPL-MCNC: 0.6 MG/DL (ref 0.3–1.2)
BILIRUB UR QL: NEGATIVE
BUN BLD-MCNC: 11 MG/DL (ref 9–23)
BUN/CREAT SERPL: 13.3 (ref 10–20)
CALCIUM BLD-MCNC: 9.3 MG/DL (ref 8.7–10.4)
CHLORIDE SERPL-SCNC: 106 MMOL/L (ref 98–112)
CHOLEST SERPL-MCNC: 129 MG/DL (ref ?–200)
CO2 SERPL-SCNC: 26 MMOL/L (ref 21–32)
COLOR UR: YELLOW
CREAT BLD-MCNC: 0.83 MG/DL
DEPRECATED RDW RBC AUTO: 50.2 FL (ref 35.1–46.3)
EGFRCR SERPLBLD CKD-EPI 2021: 92 ML/MIN/1.73M2 (ref 60–?)
EOSINOPHIL # BLD AUTO: 0.24 X10(3) UL (ref 0–0.7)
EOSINOPHIL NFR BLD AUTO: 3.1 %
ERYTHROCYTE [DISTWIDTH] IN BLOOD BY AUTOMATED COUNT: 20.4 % (ref 11–15)
EST. AVERAGE GLUCOSE BLD GHB EST-MCNC: 111 MG/DL (ref 68–126)
FASTING PATIENT LIPID ANSWER: YES
FASTING STATUS PATIENT QL REPORTED: YES
GLOBULIN PLAS-MCNC: 4 G/DL (ref 2.8–4.4)
GLUCOSE BLD-MCNC: 93 MG/DL (ref 70–99)
GLUCOSE UR-MCNC: NORMAL MG/DL
HBA1C MFR BLD: 5.5 % (ref ?–5.7)
HCT VFR BLD AUTO: 36.2 %
HDLC SERPL-MCNC: 48 MG/DL (ref 40–59)
HGB BLD-MCNC: 10.8 G/DL
HGB UR QL STRIP.AUTO: NEGATIVE
IMM GRANULOCYTES # BLD AUTO: 0.01 X10(3) UL (ref 0–1)
IMM GRANULOCYTES NFR BLD: 0.1 %
INSULIN SERPL-ACNC: 29.8 MU/L (ref 3–25)
KETONES UR-MCNC: NEGATIVE MG/DL
LDLC SERPL CALC-MCNC: 69 MG/DL (ref ?–100)
LEUKOCYTE ESTERASE UR QL STRIP.AUTO: 75
LYMPHOCYTES # BLD AUTO: 1.91 X10(3) UL (ref 1–4)
LYMPHOCYTES NFR BLD AUTO: 24.8 %
MCH RBC QN AUTO: 21.1 PG (ref 26–34)
MCHC RBC AUTO-ENTMCNC: 29.8 G/DL (ref 31–37)
MCV RBC AUTO: 70.8 FL
MONOCYTES # BLD AUTO: 0.57 X10(3) UL (ref 0.1–1)
MONOCYTES NFR BLD AUTO: 7.4 %
NEUTROPHILS # BLD AUTO: 4.91 X10 (3) UL (ref 1.5–7.7)
NEUTROPHILS # BLD AUTO: 4.91 X10(3) UL (ref 1.5–7.7)
NEUTROPHILS NFR BLD AUTO: 63.9 %
NITRITE UR QL STRIP.AUTO: NEGATIVE
NONHDLC SERPL-MCNC: 81 MG/DL (ref ?–130)
OSMOLALITY SERPL CALC.SUM OF ELEC: 275 MOSM/KG (ref 275–295)
P AXIS: 53 DEGREES
P-R INTERVAL: 202 MS
PH UR: 5.5 [PH] (ref 5–8)
PLATELET # BLD AUTO: 365 10(3)UL (ref 150–450)
POTASSIUM SERPL-SCNC: 4 MMOL/L (ref 3.5–5.1)
PROT SERPL-MCNC: 8.4 G/DL (ref 5.7–8.2)
PROT UR-MCNC: 20 MG/DL
Q-T INTERVAL: 416 MS
QRS DURATION: 88 MS
QTC CALCULATION (BEZET): 439 MS
R AXIS: 56 DEGREES
RBC # BLD AUTO: 5.11 X10(6)UL
SODIUM SERPL-SCNC: 133 MMOL/L (ref 136–145)
SP GR UR STRIP: 1.02 (ref 1–1.03)
T AXIS: 34 DEGREES
T4 FREE SERPL-MCNC: 1.1 NG/DL (ref 0.8–1.7)
TRIGL SERPL-MCNC: 56 MG/DL (ref 30–149)
TSI SER-ACNC: 1.86 MIU/ML (ref 0.55–4.78)
UROBILINOGEN UR STRIP-ACNC: NORMAL
VENTRICULAR RATE: 67 BPM
VIT B12 SERPL-MCNC: 1200 PG/ML (ref 211–911)
VIT D+METAB SERPL-MCNC: 11.5 NG/ML (ref 30–100)
VLDLC SERPL CALC-MCNC: 8 MG/DL (ref 0–30)
WBC # BLD AUTO: 7.7 X10(3) UL (ref 4–11)

## 2024-01-15 PROCEDURE — 82728 ASSAY OF FERRITIN: CPT

## 2024-01-15 PROCEDURE — 83020 HEMOGLOBIN ELECTROPHORESIS: CPT

## 2024-01-15 PROCEDURE — 36415 COLL VENOUS BLD VENIPUNCTURE: CPT

## 2024-01-15 PROCEDURE — 83525 ASSAY OF INSULIN: CPT

## 2024-01-15 PROCEDURE — 93010 ELECTROCARDIOGRAM REPORT: CPT | Performed by: INTERNAL MEDICINE

## 2024-01-15 PROCEDURE — 80053 COMPREHEN METABOLIC PANEL: CPT

## 2024-01-15 PROCEDURE — 82607 VITAMIN B-12: CPT

## 2024-01-15 PROCEDURE — 83036 HEMOGLOBIN GLYCOSYLATED A1C: CPT

## 2024-01-15 PROCEDURE — 83540 ASSAY OF IRON: CPT

## 2024-01-15 PROCEDURE — 84466 ASSAY OF TRANSFERRIN: CPT

## 2024-01-15 PROCEDURE — 83021 HEMOGLOBIN CHROMOTOGRAPHY: CPT

## 2024-01-15 PROCEDURE — 82306 VITAMIN D 25 HYDROXY: CPT

## 2024-01-15 PROCEDURE — 80061 LIPID PANEL: CPT

## 2024-01-15 PROCEDURE — 81001 URINALYSIS AUTO W/SCOPE: CPT

## 2024-01-15 PROCEDURE — 85025 COMPLETE CBC W/AUTO DIFF WBC: CPT

## 2024-01-15 PROCEDURE — 93005 ELECTROCARDIOGRAM TRACING: CPT

## 2024-01-15 PROCEDURE — 84439 ASSAY OF FREE THYROXINE: CPT

## 2024-01-15 PROCEDURE — 84443 ASSAY THYROID STIM HORMONE: CPT

## 2024-01-15 PROCEDURE — 83520 IMMUNOASSAY QUANT NOS NONAB: CPT

## 2024-01-15 PROCEDURE — 84165 PROTEIN E-PHORESIS SERUM: CPT

## 2024-01-16 ENCOUNTER — TELEPHONE (OUTPATIENT)
Dept: SURGERY | Facility: CLINIC | Age: 39
End: 2024-01-16

## 2024-01-16 DIAGNOSIS — D64.9 ANEMIA, UNSPECIFIED TYPE: Primary | ICD-10-CM

## 2024-01-16 DIAGNOSIS — R71.8 LOW MEAN CORPUSCULAR VOLUME (MCV): ICD-10-CM

## 2024-01-16 LAB
DEPRECATED HBV CORE AB SER IA-ACNC: 5 NG/ML
IRON SATN MFR SERPL: 5 %
IRON SERPL-MCNC: 25 UG/DL
TIBC SERPL-MCNC: 475 UG/DL (ref 250–425)
TRANSFERRIN SERPL-MCNC: 319 MG/DL (ref 250–380)

## 2024-01-17 ENCOUNTER — TELEPHONE (OUTPATIENT)
Dept: INTERNAL MEDICINE CLINIC | Facility: CLINIC | Age: 39
End: 2024-01-17

## 2024-01-17 DIAGNOSIS — E61.1 LOW IRON: Primary | ICD-10-CM

## 2024-01-17 LAB
HGB A2 MFR BLD: 2.2 % (ref 1.5–3.5)
HGB PNL BLD ELPH: 97.8 % (ref 95.5–100)
LEPTIN: 58.9 NG/ML

## 2024-01-17 RX ORDER — FERROUS SULFATE 325(65) MG
TABLET ORAL
Qty: 120 TABLET | Refills: 0 | Status: SHIPPED | OUTPATIENT
Start: 2024-01-17

## 2024-01-17 NOTE — TELEPHONE ENCOUNTER
Called patient in regards to result notes ( identified name and  )    Requesting medication to be sent to the [facility]    Pharmacy list updated and RX sent     bandar Greenberg MD  2024  4:04 PM CST       Iron levels are low we will send iron tablets for patient to take twice a day for a month then once a day for 2 more months we can recheck in 3 months I will also suggest patient to get occult blood test done for the stool we will place the order

## 2024-01-19 LAB
ALBUMIN SERPL ELPH-MCNC: 3.84 G/DL (ref 3.75–5.21)
ALBUMIN/GLOB SERPL: 0.95 {RATIO} (ref 1–2)
ALPHA1 GLOB SERPL ELPH-MCNC: 0.31 G/DL (ref 0.19–0.46)
ALPHA2 GLOB SERPL ELPH-MCNC: 0.82 G/DL (ref 0.48–1.05)
B-GLOBULIN SERPL ELPH-MCNC: 1.12 G/DL (ref 0.68–1.23)
GAMMA GLOB SERPL ELPH-MCNC: 1.81 G/DL (ref 0.62–1.7)
PROT SERPL-MCNC: 7.9 G/DL (ref 5.7–8.2)

## 2024-01-26 ENCOUNTER — TELEPHONE (OUTPATIENT)
Dept: SURGERY | Facility: CLINIC | Age: 39
End: 2024-01-26

## 2024-02-07 DIAGNOSIS — E66.9 OBESITY (BMI 30-39.9): Primary | ICD-10-CM

## 2024-02-07 RX ORDER — PHENTERMINE AND TOPIRAMATE 3.75; 23 MG/1; MG/1
1 CAPSULE, EXTENDED RELEASE ORAL DAILY
Qty: 30 CAPSULE | Refills: 1 | Status: SHIPPED | OUTPATIENT
Start: 2024-02-07

## 2024-02-13 ENCOUNTER — TELEPHONE (OUTPATIENT)
Dept: SURGERY | Facility: CLINIC | Age: 39
End: 2024-02-13

## 2024-02-16 DIAGNOSIS — E66.9 OBESITY (BMI 30-39.9): ICD-10-CM

## 2024-02-16 RX ORDER — PHENTERMINE AND TOPIRAMATE 3.75; 23 MG/1; MG/1
1 CAPSULE, EXTENDED RELEASE ORAL DAILY
Qty: 30 CAPSULE | Refills: 3 | Status: SHIPPED | OUTPATIENT
Start: 2024-02-16

## 2024-02-19 ENCOUNTER — OFFICE VISIT (OUTPATIENT)
Dept: OBGYN CLINIC | Facility: CLINIC | Age: 39
End: 2024-02-19
Payer: COMMERCIAL

## 2024-02-19 VITALS
HEART RATE: 82 BPM | DIASTOLIC BLOOD PRESSURE: 70 MMHG | WEIGHT: 238 LBS | HEIGHT: 65.7 IN | BODY MASS INDEX: 38.71 KG/M2 | SYSTOLIC BLOOD PRESSURE: 112 MMHG

## 2024-02-19 DIAGNOSIS — Z30.09 BIRTH CONTROL COUNSELING: ICD-10-CM

## 2024-02-19 DIAGNOSIS — Z01.419 ENCOUNTER FOR GYNECOLOGICAL EXAMINATION WITHOUT ABNORMAL FINDING: Primary | ICD-10-CM

## 2024-02-19 PROCEDURE — 3008F BODY MASS INDEX DOCD: CPT | Performed by: OBSTETRICS & GYNECOLOGY

## 2024-02-19 PROCEDURE — 3074F SYST BP LT 130 MM HG: CPT | Performed by: OBSTETRICS & GYNECOLOGY

## 2024-02-19 PROCEDURE — 3078F DIAST BP <80 MM HG: CPT | Performed by: OBSTETRICS & GYNECOLOGY

## 2024-02-19 PROCEDURE — 99395 PREV VISIT EST AGE 18-39: CPT | Performed by: OBSTETRICS & GYNECOLOGY

## 2024-02-19 NOTE — PROGRESS NOTES
Meli Sheridan is a 38 year old female  Patient's last menstrual period was 02/15/2024.   Chief Complaint   Patient presents with    Gyn Exam     ANNUAL EXAM    Contraception     Wishes to start on BCM. Hx torsion in past   .    OBSTETRICS HISTORY:     OB History    Para Term  AB Living   1 0 0 0 1 0   SAB IAB Ectopic Multiple Live Births   0 0 0 0 0      # Outcome Date GA Lbr Leonel/2nd Weight Sex Delivery Anes PTL Lv   1 AB                GYNE HISTORY:     Periods regular monthly      Condoms    History   Sexual Activity    Sexual activity: Not on file        Hx Prior Abnormal Pap: No  Pap Date: 22  Pap Result Notes: NEG PAP / NEG HPV          Latest Ref Rng & Units 2022     2:38 PM   RECENT PAP RESULTS   Thinprep Pap Negative for intraepithelial lesion or malignancy Negative for intraepithelial lesion or malignancy    HPV Negative Negative          MEDICAL HISTORY:     No past medical history on file.  Past Surgical History:   Procedure Laterality Date    OOPHORECTOMY  10/05/2020    laparascopic RSO for torsed large ov cyst     OB History    Para Term  AB Living   1 0 0 0 1 0   SAB IAB Ectopic Multiple Live Births   0 0 0 0 0        SOCIAL HISTORY:     Tobacco Use: Low Risk  (2024)    Patient History     Smoking Tobacco Use: Never     Smokeless Tobacco Use: Never     Passive Exposure: Not on file       FAMILY HISTORY:     Family History   Problem Relation Age of Onset    Diabetes Father          MEDICATIONS:       Current Outpatient Medications:     Norethindrone-Eth Estradiol 0.4-35 MG-MCG Oral Tab, Take 1 tablet by mouth daily., Disp: 84 tablet, Rfl: 1    Phentermine-Topiramate (QSYMIA) 3.75-23 MG Oral Capsule SR 24 Hr, Take 1 capsule by mouth daily., Disp: 30 capsule, Rfl: 3    Ferrous Sulfate 325 (65 Fe) MG Oral Tab, Take 2 x a day x 1 month then 1 time a day x 2 months, Disp: 120 tablet, Rfl: 0    Tirzepatide-Weight Management (ZEPBOUND) 2.5 MG/0.5ML  Subcutaneous Solution Auto-injector, Inject 2.5 mg into the skin once a week., Disp: 2 mL, Rfl: 0    ALLERGIES:     No Known Allergies      REVIEW OF SYSTEMS:     Constitutional:    denies fever / chills  Eyes:     denies blurred or double vision  Cardiovascular:  denies chest pain or palpitations  Respiratory:    denies shortness of breath  Gastrointestinal:  denies severe abdominal pain, frequent diarrhea or constipation, nausea / vomiting  Genitourinary:    denies dysuria, bothersome incontinence  Skin/Breast:   denies any breast pain, lumps, or discharge  Neurological:    denies frequent severe headaches  Psychiatric:   denies depression or anxiety, thoughts of harming self or others  Heme/Lymph:    denies easy bruising or bleeding      PHYSICAL EXAM:   Blood pressure 112/70, pulse 82, height 5' 5.7\" (1.669 m), weight 238 lb (108 kg), last menstrual period 02/15/2024.  Constitutional:  well developed, well nourished  Head/Face:  normocephalic  Neck/Thyroid: thyroid symmetric, no thyromegaly, no nodules, no adenopathy  Lymphatic: no abnormal supraclavicular or axillary adenopathy is noted  Breast:   normal without palpable masses, tenderness, asymmetry, nipple discharge, nipple retraction or skin changes  Abdomen:   soft, nontender, nondistended, no masses  Skin/Hair:  no unusual rashes or bruises  Extremities:  no edema, no cyanosis  Psychiatric:   oriented to time, place, person and situation. Appropriate mood and affect    Pelvic Exam:  External Genitalia:  normal appearance, hair distribution, and no lesions  Urethral Meatus:   normal in size, location, without lesions and prolapse  Bladder:    no fullness, masses or tenderness  Vagina:    normal appearance without lesions, no abnormal discharge  Cervix:    normal without tenderness on motion  Uterus:    normal in size, contour, position, mobility, without tenderness  Adnexa:   normal without masses or tenderness  Perineum:   normal  Anus: no hemorroids          ASSESSMENT & PLAN:     Meli was seen today for gyn exam and contraception.    Diagnoses and all orders for this visit:    Encounter for gynecological examination without abnormal finding    Birth control counseling    Other orders  -     Norethindrone-Eth Estradiol 0.4-35 MG-MCG Oral Tab; Take 1 tablet by mouth daily.    Reviewed options of ocps / patch / nuvaring / IUDs - -wishes to try ocps  Given hx of ovarian torsion, plan to use 35 mcg low dose OCP -- f/u in 3-4 months. Start today.  SUMMARY:  Pap: Next cotest 2/25-27 per ASCCP guidelines.  BCM:  Condoms  STD screening: declines  Mammogram: n/a -- once 40 yrs old  HM updated  Depression screen:   Depression Screening (PHQ-2/PHQ-9): Over the LAST 2 WEEKS   Little interest or pleasure in doing things: Not at all    Feeling down, depressed, or hopeless: Not at all    PHQ-2 SCORE: 0          FOLLOW-UP     Return for med follow-up in 3-4 months.    Note to patient and family:  The 21st Century Cures Act makes medical notes available to patients in the interest of transparency.  However, please be advised that this is a medical document.  It is intended as a peer to peer communication.  It is written in medical language and may contain abbreviations or verbiage that are technical and unfamiliar.  It may appear blunt or direct.  Medical documents are intended to carry relevant information, facts as evident, and the clinical opinion of the practitioner.

## 2024-02-28 ENCOUNTER — TELEMEDICINE (OUTPATIENT)
Dept: SURGERY | Facility: CLINIC | Age: 39
End: 2024-02-28
Payer: COMMERCIAL

## 2024-02-28 VITALS — BODY MASS INDEX: 39 KG/M2 | WEIGHT: 238 LBS

## 2024-02-28 DIAGNOSIS — Z86.2 HISTORY OF ANEMIA: ICD-10-CM

## 2024-02-28 DIAGNOSIS — G47.30 SLEEP APNEA, UNSPECIFIED TYPE: ICD-10-CM

## 2024-02-28 DIAGNOSIS — Z51.81 ENCOUNTER FOR THERAPEUTIC DRUG MONITORING: Primary | ICD-10-CM

## 2024-02-28 DIAGNOSIS — E66.9 OBESITY (BMI 30-39.9): ICD-10-CM

## 2024-02-28 DIAGNOSIS — E55.9 VITAMIN D DEFICIENCY: ICD-10-CM

## 2024-02-28 DIAGNOSIS — R06.83 SNORING: ICD-10-CM

## 2024-02-28 PROCEDURE — 99213 OFFICE O/P EST LOW 20 MIN: CPT | Performed by: NURSE PRACTITIONER

## 2024-02-28 NOTE — PROGRESS NOTES
Virtual Video & Audio Check-In    Meli Sheridan verbally consents to a Virtual/Telephone Check-In visit on 24.  Patient has been referred to the [facility] website at [email or web address] to review the yearly Consent to Treat document.    Patient understands and accepts financial responsibility for any deductible, co-insurance and/or co-pays associated with this service.    Duration of the service: 13 minutes.    Summary of topics discussed: Obesity/weight management, Lifestyle and behavior modifications, Medication management.      [facility]  [address]  [place] [address]  Dept: [phone]       Patient:  Meli Sheridan  :      1985  MRN:      [id number]    Chief Complaint:    Chief Complaint   Patient presents with    Follow - Up    Obesity    Weight Management       SUBJECTIVE     History of Present Illness:  Meli is being seen today for a follow-up for weight management.     Doing well.  Started Qsymia 3 days ago. Tolerating.    Initial HPI:  37 yo female.  Presents to clinic for assistance with weight loss/maintenance.   Reports slow/steady weight gain over the past 5 years.   Recently lost 20 lbs with lifestyle modifications.     Patient is considering medications and is not considering bariatric surgery for weight loss.    Patient denies any history of eating disorder(s).    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    Patient's goal weight: healthy   Biggest weight loss in the past: 20 lbs  How weight loss was achieved: lifestyle changes  Heaviest weight ever: 265 lbs   Previous use of medical weight loss medications: None     Physical activity: Crossfit 2-3 days/week    Sleep: interrupted hours/night    Sleep screening: +gasping, choking, snoring      Past Medical History: History reviewed. No pertinent past medical history.     Comorbidities:      OBJECTIVE     Vitals: Wt 238 lb (108 kg)   LMP  02/15/2024   BMI 38.77 kg/m²     Initial weight loss: -07   Total weight loss:  -07   Start weight: 245    Wt Readings from Last 6 Encounters:   02/28/24 238 lb (108 kg)   02/19/24 238 lb (108 kg)   01/11/24 245 lb (111.1 kg)   08/03/23 255 lb (115.7 kg)   02/21/22 258 lb 6.4 oz (117.2 kg)   10/21/20 258 lb (117 kg)       Patient Medications:    Current Outpatient Medications   Medication Sig Dispense Refill    cholecalciferol 1.25 MG (75568 UT) Oral Cap Take 1 capsule (1.25 mg total) by mouth every 7 days. 4 capsule 5    Norethindrone-Eth Estradiol 0.4-35 MG-MCG Oral Tab Take 1 tablet by mouth daily. 84 tablet 1    Phentermine-Topiramate (QSYMIA) 3.75-23 MG Oral Capsule SR 24 Hr Take 1 capsule by mouth daily. 30 capsule 3    Ferrous Sulfate 325 (65 Fe) MG Oral Tab Take 2 x a day x 1 month then 1 time a day x 2 months 120 tablet 0     Allergies:  Patient has no known allergies.     Social History:  Reviewed     Surgical History:    Past Surgical History:   Procedure Laterality Date    OOPHORECTOMY  10/05/2020    laparascopic RSO for torsed large ov cyst     Family History:    Family History   Problem Relation Age of Onset    Diabetes Father      Initial Intake:  After dinner behavior: + ice cream bar, popcorn   Night eating: -  Portion sizes: -  Binge: -  Emotional: -  Depression: Score: 6  Grazing: +  Sweet tooth: +  Crunchy/salty: +  Etoh:  Drinks mostly water, macha tea, green tea   Soda Drinker: Yes                 If yes, how much?:  with FF   Sports Drinks:  No                  Juice:  No                     Number of restaurant or fast food meals/week:  2 meals/week    Food Journal  Reviewed and Discussed:       Patient has a Food Journal?: yes Cronometer   Patient is reading nutrition labels?  yes  Average Caloric Intake:     Average CHO Intake:   Is patient exercising? yes  Type of exercise? Crossfit 2-3 days/week    Eating Habits  Patient states the following:  Eats 2 meal(s) per day  Length of time it  takes to consume a meal:    # of snacks per day:    Type of snacks:    Amount of soda consumption per day:  Rare with FF   Amount of water (in ounces) per day:  Adequate  Toughest challenge:      Protein smoothie replaced FF    Nutritional Goals  Eat 3-4 cups of fresh fruits or vegetables daily    Behavior Modifications Reviewed and Discussed  Eat breakfast, Eat 3 meals per day, Plan meals in advance, Read nutrition labels, Drink 64 oz of water per day, Maintain a daily food journal, Utlize portion control strategies to reduce calorie intake, Identify triggers for eating and manage cues, and Eat slowly and take 20 to 30 minutes to complete each meal    Exercise Goals Reviewed and Discussed    Aim for 150 minutes moderate level exercise weekly with 2-3 days strength training as tolerated     ROS:    Constitutional: negative  Respiratory: negative  Cardiovascular: negative  Gastrointestinal: negative  Integument/breast: negative  Hematologic/lymphatic: negative  Musculoskeletal:negative  Neurological: positive for headaches  Behavioral/Psych: negative  Endocrine: negative  All other systems were reviewed and are negative    Physical Exam:  General: alert, oriented x 3, cooperative, speaking in full sentences, appears stated age and cooperative, obese   Head: Normocephalic, without obvious abnormality, atraumatic  Neck: symmetrical, trachea midline   Lungs: No increased work of breathing   Extremities: extremities normal  Skin: Upper body skin color and texture appear intact       ASSESSMENT     Encounter Diagnosis(ses):   Encounter Diagnoses   Name Primary?    Sleep apnea, unspecified type     History of anemia     Snoring     Encounter for therapeutic drug monitoring Yes    Obesity (BMI 30-39.9)     Vitamin D deficiency        PLAN       Diagnoses and all orders for this visit:    Encounter for therapeutic drug monitoring    Sleep apnea, unspecified type    History of anemia    Snoring    Obesity (BMI  30-39.9)    Vitamin D deficiency  -     cholecalciferol 1.25 MG (46612 UT) Oral Cap; Take 1 capsule (1.25 mg total) by mouth every 7 days.          OBESITY/WEIGHT GAIN:     Recommended patient continue intensive lifestyle and behavioral modifications at this time for weight loss.     Reviewed lifestyle modifications: Whole Food/Plant Strong/Low Glycemic Index diet, moderate alcohol consumption, reduced sodium intake to no more than 2,400 mg/day, and at least 150 minutes of moderate physical activity per week.   Avoid processed, poor quality carbohydrates, refined grains, flour, sugar.     Goals for next month:  1. Keep a food log.  2. Drink 64 ounces of non-caloric beverages per day. No fruit juices or regular soda.  3. Aim for 150 minutes moderate exercise per week.    4. Increase fruit and vegetable servings to 5-6 per day.    5. Improve sleep and stress.      Reviewed labs:   1/15/24- Leptin, insulin elevated- fasting.  Vitamin D low- supplement weekly.  B 12, A1c, thyroid studies in range.  Abnormal hgb, iron level, ferritin- referred to hematologist   Sodium low- monitor.     Discussed medication options for weight loss in detail with patient.      Denies personal or family hx medullary thyroid CA, endocrine neoplasia syndrome, pancreatitis hx, suicidal ideation. No renal impairment, severe GI disease, diabetes, pancreatitis risks noted.     No Zepbound 2.5 mg weekly coverage.     Continue daily Qsymia. Increase dose as tolerated. Current dose 3.75-23 mg.     SQ administration teaching provided to patient.   Discussed risks, benefits, and side effects of medication.     EKG done 1/15/24.     Recommend sleep consult.     Healthy Plate Method.   Aim for 88 grams protein/day.  Other lifestyle modifications as stated in patient instructions.      RTC 2 months.      SUSHILA Law

## 2024-04-11 RX ORDER — FERROUS SULFATE 325(65) MG
TABLET ORAL
Qty: 60 TABLET | Refills: 2 | Status: SHIPPED | OUTPATIENT
Start: 2024-04-11

## 2024-04-11 NOTE — TELEPHONE ENCOUNTER
Please review; protocol failed/No Protocol    LOV: 08/03/2023    Lab note 01/15/2024  Iron levels are low we will send iron tablets for patient to take twice a day for a month then once a day for 2 more months we can recheck in 3 months     Sent [employer] message to patient to complete labs.     Requested Prescriptions   Pending Prescriptions Disp Refills    FERROUS SULFATE 325 (65 Fe) MG Oral Tab [Pharmacy Med Name: FERROUS SULFATE 325 MG TABLET] 120 tablet 0     Sig: TAKE 1 TABLET BY MOUTH TWICE A DAY FOR 1 MONTH, THEN 1 TIME A DAY FOR 2 MONTHS       There is no refill protocol information for this order        Future Appointments         Provider Department Appt Notes    In 1 week Savita Potts APRN [facility]     In 3 weeks Kendall Chou PA-C [facility] Snoring, mask policy advised    In 1 month Kena Morales MD [facility] - OB/GYN med follow up          Recent Outpatient Visits              1 month ago [facility] for therapeutic drug monitoring    [facility]    [facility]icine    1 month ago Encounter for gynecological examination without abnormal finding    [facility] - [facility] Kena Morales MD    Office Visit    3 months ago Encounter for therapeutic drug monitoring    [facility] Savita Mejia APRN    Office Visit    8 months ago Annual physical exam    [facility] Sal Greenberg MD    Office Visit    2 years ago Encounter for gynecological examination without abnormal finding    [facility] - OB[facility]GY[facility] Kena Morales MD    Office Visit

## 2024-04-19 ENCOUNTER — OFFICE VISIT (OUTPATIENT)
Dept: SURGERY | Facility: CLINIC | Age: 39
End: 2024-04-19
Payer: COMMERCIAL

## 2024-04-19 VITALS
HEART RATE: 68 BPM | OXYGEN SATURATION: 98 % | HEIGHT: 66.5 IN | SYSTOLIC BLOOD PRESSURE: 116 MMHG | WEIGHT: 224 LBS | BODY MASS INDEX: 35.57 KG/M2 | DIASTOLIC BLOOD PRESSURE: 80 MMHG

## 2024-04-19 DIAGNOSIS — E66.9 OBESITY (BMI 30-39.9): ICD-10-CM

## 2024-04-19 DIAGNOSIS — R06.83 SNORING: ICD-10-CM

## 2024-04-19 DIAGNOSIS — Z51.81 ENCOUNTER FOR THERAPEUTIC DRUG MONITORING: Primary | ICD-10-CM

## 2024-04-19 DIAGNOSIS — E55.9 VITAMIN D DEFICIENCY: ICD-10-CM

## 2024-04-19 DIAGNOSIS — Z86.2 HISTORY OF ANEMIA: ICD-10-CM

## 2024-04-19 PROCEDURE — 3074F SYST BP LT 130 MM HG: CPT | Performed by: NURSE PRACTITIONER

## 2024-04-19 PROCEDURE — 3079F DIAST BP 80-89 MM HG: CPT | Performed by: NURSE PRACTITIONER

## 2024-04-19 PROCEDURE — 99213 OFFICE O/P EST LOW 20 MIN: CPT | Performed by: NURSE PRACTITIONER

## 2024-04-19 PROCEDURE — 3008F BODY MASS INDEX DOCD: CPT | Performed by: NURSE PRACTITIONER

## 2024-04-19 RX ORDER — PHENTERMINE AND TOPIRAMATE 7.5; 46 MG/1; MG/1
1 CAPSULE, EXTENDED RELEASE ORAL DAILY
Qty: 30 CAPSULE | Refills: 3 | Status: SHIPPED | OUTPATIENT
Start: 2024-04-19

## 2024-04-19 NOTE — PROGRESS NOTES
[facility]  [address] [facility] [address] [place] [id number]  Dept: [phone]       Patient:  Meli Sheridan  :      1985  MRN:      [id number]    Chief Complaint:    Chief Complaint   Patient presents with    Follow - Up    Weight Management    Obesity       SUBJECTIVE     History of Present Illness:  Meli is being seen today for a follow-up for weight management.       Initial HPI:  37 yo female.  Presents to clinic for assistance with weight loss/maintenance.   Reports slow/steady weight gain over the past 5 years.   Recently lost 20 lbs with lifestyle modifications.     Patient is considering medications and is not considering bariatric surgery for weight loss.    Patient denies any history of eating disorder(s).    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    Patient's goal weight: healthy   Biggest weight loss in the past: 20 lbs  How weight loss was achieved: lifestyle changes  Heaviest weight ever: 265 lbs   Previous use of medical weight loss medications: None     Physical activity: Crossfit 2-3 days/week    Sleep: interrupted hours/night    Sleep screening: +gasping, choking, snoring      Past Medical History: History reviewed. No pertinent past medical history.     Comorbidities:      OBJECTIVE     Vitals: /80 (BP Location: Right arm, Patient Position: Sitting, Cuff Size: adult)   Pulse 68   Ht 5' 6.5\" (1.689 m)   Wt 224 lb (101.6 kg)   LMP 02/15/2024   SpO2 98%   BMI 35.61 kg/m²     Initial weight loss: -14   Total weight loss:  -21   Start weight: 245    Wt Readings from Last 6 Encounters:   24 224 lb (101.6 kg)   24 238 lb (108 kg)   24 238 lb (108 kg)   24 245 lb (111.1 kg)   23 255 lb (115.7 kg)   22 258 lb 6.4 oz (117.2 kg)       Patient Medications:    Current Outpatient Medications   Medication Sig Dispense Refill    Phentermine-Topiramate (QSYMIA) 7.5-46 MG  Oral Capsule SR 24 Hr Take 1 capsule by mouth daily. 30 capsule 3    Ferrous Sulfate 325 (65 Fe) MG Oral Tab TAKE 1 TABLET BY MOUTH TWICE A DAY FOR 1 MONTH, THEN 1 TIME A DAY FOR 2 MONTHS 60 tablet 2    cholecalciferol 1.25 MG (89554 UT) Oral Cap Take 1 capsule (1.25 mg total) by mouth every 7 days. 4 capsule 5    Norethindrone-Eth Estradiol 0.4-35 MG-MCG Oral Tab Take 1 tablet by mouth daily. 84 tablet 1    Phentermine-Topiramate (QSYMIA) 3.75-23 MG Oral Capsule SR 24 Hr Take 1 capsule by mouth daily. 30 capsule 3     Allergies:  Patient has no known allergies.     Social History:  Reviewed     Surgical History:    Past Surgical History:   Procedure Laterality Date    Oophorectomy  10/05/2020    laparascopic RSO for torsed large ov cyst     Family History:    Family History   Problem Relation Age of Onset    Diabetes Father      Initial Intake:  After dinner behavior: + ice cream bar, popcorn   Night eating: -  Portion sizes: -  Binge: -  Emotional: -  Depression: Score: 6  Grazing: +  Sweet tooth: +  Crunchy/salty: +  Etoh:  Drinks mostly water, macha tea, green tea   Soda Drinker: Yes                 If yes, how much?:  with FF   Sports Drinks:  No                  Juice:  No                     Number of restaurant or fast food meals/week:  2 meals/week    Food Journal  Reviewed and Discussed:       Patient has a Food Journal?: yes Cronometer   Patient is reading nutrition labels?  yes  Average Caloric Intake:     Average CHO Intake:   Is patient exercising? yes  Type of exercise? Crossfit 2-3 days/week    Eating Habits  Patient states the following:  Eats 2 meal(s) per day  Length of time it takes to consume a meal:    # of snacks per day:    Type of snacks:    Amount of soda consumption per day:  Rare with FF   Amount of water (in ounces) per day:  Adequate  Toughest challenge:      Protein smoothie replaced FF  Protein, produce, added avocado to breakfast     Nutritional Goals  Eat 3-4 cups of fresh fruits or  vegetables daily    Behavior Modifications Reviewed and Discussed  Eat breakfast, Eat 3 meals per day, Plan meals in advance, Read nutrition labels, Drink 64 oz of water per day, Maintain a daily food journal, Utlize portion control strategies to reduce calorie intake, Identify triggers for eating and manage cues, and Eat slowly and take 20 to 30 minutes to complete each meal    Exercise Goals Reviewed and Discussed    Aim for 150 minutes moderate level exercise weekly with 2-3 days strength training as tolerated     ROS:    Constitutional: negative  Respiratory: negative  Cardiovascular: negative  Gastrointestinal: negative  Integument/breast: negative  Hematologic/lymphatic: negative  Musculoskeletal:negative  Neurological: positive for headaches  Behavioral/Psych: negative  Endocrine: negative  All other systems were reviewed and are negative    Physical Exam:  General appearance: alert, appears stated age, cooperative and obese  Head: Normocephalic, without obvious abnormality, atraumatic  Neck: no adenopathy, no carotid bruit, no JVD, supple, symmetrical, trachea midline and thyroid not enlarged, symmetric, no tenderness/mass/nodules  Lungs: clear to auscultation bilaterally  Heart: S1, S2 normal, no murmur, click, rub or gallop, regular rate and rhythm  Abdomen: soft, non-tender; bowel sounds normal; no masses,  no organomegaly and abdomen obese   Extremities: intact, no edema   Pulses: 2+ and symmetric  Skin: intact   Neurologic: Grossly normal        ASSESSMENT     Encounter Diagnosis(ses):   Encounter Diagnoses   Name Primary?    Encounter for therapeutic drug monitoring Yes    Obesity (BMI 30-39.9)     History of anemia     Vitamin D deficiency     Snoring        PLAN       Diagnoses and all orders for this visit:    Encounter for therapeutic drug monitoring    Obesity (BMI 30-39.9)  -     Phentermine-Topiramate (QSYMIA) 7.5-46 MG Oral Capsule SR 24 Hr; Take 1 capsule by mouth daily.    History of  anemia    Vitamin D deficiency    Snoring          OBESITY/WEIGHT GAIN:     Recommended patient continue intensive lifestyle and behavioral modifications at this time for weight loss.     Reviewed lifestyle modifications: Whole Food/Plant Strong/Low Glycemic Index diet, moderate alcohol consumption, reduced sodium intake to no more than 2,400 mg/day, and at least 150 minutes of moderate physical activity per week.   Avoid processed, poor quality carbohydrates, refined grains, flour, sugar.     Goals for next month:  1. Keep a food log.  2. Drink 64 ounces of non-caloric beverages per day. No fruit juices or regular soda.  3. Aim for 150 minutes moderate exercise per week.    4. Increase fruit and vegetable servings to 5-6 per day.    5. Improve sleep and stress.      Reviewed labs:   1/15/24- Leptin, insulin elevated- fasting.  Vitamin D low- supplement weekly.  B 12, A1c, thyroid studies in range.  Abnormal hgb, iron level, ferritin- referred to hematologist   Sodium low- monitor.     Discussed medication options for weight loss in detail with patient.      Denies personal or family hx medullary thyroid CA, endocrine neoplasia syndrome, pancreatitis hx, suicidal ideation. No renal impairment, severe GI disease, diabetes, pancreatitis risks noted.     No Zepbound 2.5 mg weekly coverage.     Continue daily Qsymia. Increase dose to 7.5-46 mg in the AM.    Consider metformin.      SQ administration teaching provided to patient.   Discussed risks, benefits, and side effects of medication.     EKG done 1/15/24.     Recommend sleep consult: planned 5/3/24.      Healthy Plate Method.   Aim for 88 grams protein/day.  Other lifestyle modifications as stated in patient instructions.      RTC 3-4 months.      SUSHILA Law

## 2024-05-03 ENCOUNTER — OFFICE VISIT (OUTPATIENT)
Dept: PULMONOLOGY | Facility: CLINIC | Age: 39
End: 2024-05-03

## 2024-05-03 VITALS
HEART RATE: 70 BPM | OXYGEN SATURATION: 99 % | DIASTOLIC BLOOD PRESSURE: 73 MMHG | HEIGHT: 66 IN | WEIGHT: 231.63 LBS | BODY MASS INDEX: 37.23 KG/M2 | SYSTOLIC BLOOD PRESSURE: 120 MMHG

## 2024-05-03 DIAGNOSIS — R06.83 SNORING: Primary | ICD-10-CM

## 2024-05-03 DIAGNOSIS — R29.818 SUSPECTED SLEEP APNEA: ICD-10-CM

## 2024-05-03 PROCEDURE — 3008F BODY MASS INDEX DOCD: CPT | Performed by: PHYSICIAN ASSISTANT

## 2024-05-03 PROCEDURE — 3078F DIAST BP <80 MM HG: CPT | Performed by: PHYSICIAN ASSISTANT

## 2024-05-03 PROCEDURE — 3074F SYST BP LT 130 MM HG: CPT | Performed by: PHYSICIAN ASSISTANT

## 2024-05-03 PROCEDURE — 99243 OFF/OP CNSLTJ NEW/EST LOW 30: CPT | Performed by: PHYSICIAN ASSISTANT

## 2024-05-03 NOTE — PROGRESS NOTES
Pulmonary Consult Note    History of Present Illness:  Meli Sheridan is a 38 year old female presenting to pulmonary clinic today for suspected sleep disordered breathing, referred by Savita CONTI. The patient describes occasionally unrefreshing sleep. She has daytime sleepiness. No naps. There is snoring. There is no gasping for air but there is history of witnessed apneic events. She goes to bed at 11 pm, falls asleep promptly, and awakens at 5:30 am. There are no nocturnal awakenings. There are no morning headaches. There is no drowsy driving. There is occasional alcohol use. There is history of seasonal depression. There is no urge to move the limbs, cataplexy, hypnagogic hallucinations, or sleep paralysis. [employer] Sleepiness Scale score is 12/24.      Past Medical History: None    Past Surgical History: Oophorectomy    Family Medical History: Mother alive and well, father alive with diabetes    Social History: Single, no kids, works as teacher  -Tobacco: Lifelong nonsmoker  -Alcohol: Occasional    Allergies: Patient has no known allergies.     Medications: has a current medication list which includes the following prescription(s): qsymia, ferrous sulfate, cholecalciferol, norethindrone-eth estradiol, and qsymia.    Review of Systems:   Constitutional: +Intentional weight loss of 30 pounds in the 6 months.  HEENT: +Chronic nasal congestion.  Cardio: No chest pain.  Respiratory: No shortness of breath.  GI: No acid reflux.  Extremities: No lower extremity swelling or pain.  Neurologic: No headache.  Psych: +Seasonal depression.     Physical Exam:  /73   Pulse 70   Ht 5' 6\" (1.676 m)   Wt 231 lb 9.6 oz (105.1 kg)   LMP 02/15/2024   SpO2 99%   BMI 37.38 kg/m²    Constitutional: Obese. No acute distress.  HEENT: Head NC/AT. PEERL. 2+ tonsils. Crowded oropharynx. Mallampati class 3.  Cardio: Regular rate and rhythm. Normal S1 and S2. No murmurs.   Respiratory: Thorax symmetrical with no labored  breathing. Clear to ausculation bilaterally with symmetrical breath sounds. No wheezing, rhonchi, or crackles.   Extremities: No clubbing or cyanosis. No LE edema. No calf tenderness.  Neurologic: A&Ox3. No gross motor deficits.  Skin: Warm, dry.  Lymphatic: No cervical or supraclavicular lymphadenopathy.  Psych: Pleasant affect. Cooperative.    Results: None    Assessment/Plan:  Suspected PINA - High suspicion for PINA in patient with snoring, witnessed apneic events, hypersomnia, and crowded oropharynx. Discussed risks of untreated PINA including increased risk of cardiovascular and cerebrovascular events.   Plan:   -Home sleep study  -Weight loss  -Avoid alcohol  -Avoid sedating drugs  -Never drive if sleepy  -Follow-up contingent on results of above    Kendall Chou PA-C  Pulmonary Medicine  5/3/2024

## 2024-06-20 ENCOUNTER — OFFICE VISIT (OUTPATIENT)
Dept: SLEEP CENTER | Age: 39
End: 2024-06-20
Attending: PHYSICIAN ASSISTANT

## 2024-06-20 DIAGNOSIS — R06.83 SNORING: Primary | ICD-10-CM

## 2024-06-20 DIAGNOSIS — R29.818 SUSPECTED SLEEP APNEA: ICD-10-CM

## 2024-06-20 PROCEDURE — 95806 SLEEP STUDY UNATT&RESP EFFT: CPT

## 2024-06-21 NOTE — PROCEDURES
[facility]  Accredited by the American Academy of Sleep Medicine (AASM)    PATIENT'S NAME: MARIBELL CALLAHAN   ATTENDING PHYSICIAN: Kendall Chou PA-C   REFERRING PHYSICIAN: Kendall Chou PA-C   PATIENT ACCOUNT #: [id number] LOCATION: Sleep Center   MEDICAL RECORD #: [id number] YOB: 1985   DATE OF STUDY: 06/20/2024       SLEEP STUDY REPORT    STUDY TYPE:  Home sleep test.    INDICATION:  Suspected obstructive sleep apnea (ICD-10 code G47.33) in a patient with witnessed apneic events, snoring, unrefreshing sleep, hypersomnia, BMI of 37.4, and an [place] Sleepiness Scale score of 11/24.    RESULTS:  The patient underwent home sleep test with measurement of her nasal airflow, nasal air pressure, snoring, chest and abdominal wall motion, oximetry, and body position.  I have reviewed the entirety of the raw data of this study.    During the study, the total recording time is 544 minutes.  The lights-out clock time is 11 p.m., the lights-on clock time is 8 a.m.  The apnea plus hypopnea index is 13.8 events per hour.  The supine apnea plus hypopnea index is 12.8 events per hour.  The average oxygen saturation is 97%, the lowest oxygen saturation is 78%, and the patient spent 1% of the test with saturations 88% or less.  The average heart rate is 61 beats per minute, and the patient spent approximately 55% of the test in the supine position.    INTERPRETATION:  The data generated from this study is consistent with mild obstructive sleep apnea (ICD-10 code G47.33).    RECOMMENDATIONS:    1.   Consider CPAP titration in this symptomatic patient.    2.   Weight loss.   3.   Avoid alcohol.    4.   Avoid sedating drug.   5.   Patient should not drive if at all sleepy.    Please do not hesitate to contact me if there is any question whatsoever regarding interpretation of this study.    Dictated By Ori Carter MD  d: 06/21/2024 17:11:43  t: 06/21/2024  17:53:14  [facility] [id number]  [facility]/    cc: Kendall Chou PA-C

## 2024-06-30 ENCOUNTER — PATIENT MESSAGE (OUTPATIENT)
Dept: PULMONOLOGY | Facility: CLINIC | Age: 39
End: 2024-06-30

## 2024-06-30 DIAGNOSIS — G47.33 OSA (OBSTRUCTIVE SLEEP APNEA): Primary | ICD-10-CM

## 2024-07-01 NOTE — TELEPHONE ENCOUNTER
From: Meli Sheridan  To: Kendall Chou  Sent: 6/30/2024 2:58 PM CDT  Subject: Sleep study follow up     Hi Dr Argueta,  I’d like to move forward with getting CPAP machine after doing the sleep study.     Let me know what I my next steps are.     Thank you.   Meli

## 2024-07-18 ENCOUNTER — TELEPHONE (OUTPATIENT)
Dept: PULMONOLOGY | Facility: CLINIC | Age: 39
End: 2024-07-18

## 2024-07-18 NOTE — TELEPHONE ENCOUNTER
CMN for CPAP and supplies received from [facility] and placed in Kendall CUNNINGHAM's folder for signature.

## 2024-07-18 NOTE — TELEPHONE ENCOUNTER
CMN signed and faxed back to [facility]. Confirmation reveived and sent to scanning

## 2024-08-23 ENCOUNTER — OFFICE VISIT (OUTPATIENT)
Dept: SURGERY | Facility: CLINIC | Age: 39
End: 2024-08-23
Payer: COMMERCIAL

## 2024-08-23 VITALS
HEIGHT: 66.5 IN | SYSTOLIC BLOOD PRESSURE: 114 MMHG | BODY MASS INDEX: 34.46 KG/M2 | OXYGEN SATURATION: 97 % | WEIGHT: 217 LBS | DIASTOLIC BLOOD PRESSURE: 72 MMHG

## 2024-08-23 DIAGNOSIS — Z51.81 ENCOUNTER FOR THERAPEUTIC DRUG MONITORING: Primary | ICD-10-CM

## 2024-08-23 DIAGNOSIS — E55.9 VITAMIN D DEFICIENCY: ICD-10-CM

## 2024-08-23 DIAGNOSIS — G47.30 SLEEP APNEA, UNSPECIFIED TYPE: ICD-10-CM

## 2024-08-23 DIAGNOSIS — Z86.2 HISTORY OF ANEMIA: ICD-10-CM

## 2024-08-23 DIAGNOSIS — E66.9 OBESITY (BMI 30-39.9): ICD-10-CM

## 2024-08-23 DIAGNOSIS — R06.83 SNORING: ICD-10-CM

## 2024-08-23 PROCEDURE — 3008F BODY MASS INDEX DOCD: CPT | Performed by: NURSE PRACTITIONER

## 2024-08-23 PROCEDURE — 3074F SYST BP LT 130 MM HG: CPT | Performed by: NURSE PRACTITIONER

## 2024-08-23 PROCEDURE — 99213 OFFICE O/P EST LOW 20 MIN: CPT | Performed by: NURSE PRACTITIONER

## 2024-08-23 PROCEDURE — 3078F DIAST BP <80 MM HG: CPT | Performed by: NURSE PRACTITIONER

## 2024-08-23 RX ORDER — SEMAGLUTIDE 0.25 MG/.5ML
0.25 INJECTION, SOLUTION SUBCUTANEOUS WEEKLY
Qty: 4 EACH | Refills: 1 | Status: SHIPPED | OUTPATIENT
Start: 2024-08-23

## 2024-08-23 NOTE — PROGRESS NOTES
[facility]  [address] [facility] [address] [place] [id number]  Dept: [phone]       Patient:  Meli Sheridan  :      1985  MRN:      [id number]    Chief Complaint:    Chief Complaint   Patient presents with    Obesity    Follow - Up    Weight Management       SUBJECTIVE     History of Present Illness:  Meli is being seen today for a follow-up for weight management.     Doing well.    Initial HPI:  39 yo female.  Presents to clinic for assistance with weight loss/maintenance.   Reports slow/steady weight gain over the past 5 years.   Recently lost 20 lbs with lifestyle modifications.     Patient is considering medications and is not considering bariatric surgery for weight loss.    Patient denies any history of eating disorder(s).    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    Patient's goal weight: healthy   Biggest weight loss in the past: 20 lbs  How weight loss was achieved: lifestyle changes  Heaviest weight ever: 265 lbs   Previous use of medical weight loss medications: None     Physical activity: Crossfit 2-3 days/week    Sleep: interrupted hours/night    Sleep screening: +gasping, choking, snoring      Past Medical History: History reviewed. No pertinent past medical history.     Comorbidities:      OBJECTIVE     Vitals: /72 (BP Location: Right arm, Patient Position: Sitting, Cuff Size: adult)   Ht 5' 6.5\" (1.689 m)   Wt 217 lb (98.4 kg)   LMP 02/15/2024   SpO2 97%   BMI 34.50 kg/m²     Initial weight loss: -07   Total weight loss:  -28   Start weight: 245    Wt Readings from Last 6 Encounters:   24 217 lb (98.4 kg)   24 231 lb 9.6 oz (105.1 kg)   24 224 lb (101.6 kg)   24 238 lb (108 kg)   24 238 lb (108 kg)   24 245 lb (111.1 kg)       Patient Medications:    Current Outpatient Medications   Medication Sig Dispense Refill    semaglutide-weight management (WEGOVY) 0.25  MG/0.5ML Subcutaneous Solution Auto-injector Inject 0.5 mL (0.25 mg total) into the skin once a week. 4 each 1    Phentermine-Topiramate (QSYMIA) 7.5-46 MG Oral Capsule SR 24 Hr Take 1 capsule by mouth daily. 30 capsule 3    Ferrous Sulfate 325 (65 Fe) MG Oral Tab TAKE 1 TABLET BY MOUTH TWICE A DAY FOR 1 MONTH, THEN 1 TIME A DAY FOR 2 MONTHS 60 tablet 2    cholecalciferol 1.25 MG (62644 UT) Oral Cap Take 1 capsule (1.25 mg total) by mouth every 7 days. 4 capsule 5    Norethindrone-Eth Estradiol 0.4-35 MG-MCG Oral Tab Take 1 tablet by mouth daily. 84 tablet 1     Allergies:  Patient has no known allergies.     Social History:  Reviewed     Surgical History:    Past Surgical History:   Procedure Laterality Date    Oophorectomy  10/05/2020    laparascopic RSO for torsed large ov cyst     Family History:    Family History   Problem Relation Age of Onset    No Known Problems Mother     Diabetes Father      Initial Intake:  After dinner behavior: + ice cream bar, popcorn   Night eating: -  Portion sizes: -  Binge: -  Emotional: -  Depression: Score: 6  Grazing: +  Sweet tooth: +  Crunchy/salty: +  Etoh:  Drinks mostly water, macha tea, green tea   Soda Drinker: Yes                 If yes, how much?:  with FF   Sports Drinks:  No                  Juice:  No                     Number of restaurant or fast food meals/week:  2 meals/week    Food Journal  Reviewed and Discussed:       Patient has a Food Journal?: yes Cronometer   Patient is reading nutrition labels?  yes  Average Caloric Intake:     Average CHO Intake:   Is patient exercising? yes  Type of exercise? Crossfit 2-3 days/week    Eating Habits  Patient states the following:  Eats 2 meal(s) per day  Length of time it takes to consume a meal:    # of snacks per day:    Type of snacks:    Amount of soda consumption per day:  Rare with FF   Amount of water (in ounces) per day:  Adequate  Toughest challenge:      Protein smoothie replaced FF  Protein, produce, added  avocado to breakfast   Aim for 80 grams protein/day  Aim for 30 grams fiber/day    Nutritional Goals  Eat 3-4 cups of fresh fruits or vegetables daily    Behavior Modifications Reviewed and Discussed  Eat breakfast, Eat 3 meals per day, Plan meals in advance, Read nutrition labels, Drink 64 oz of water per day, Maintain a daily food journal, Utlize portion control strategies to reduce calorie intake, Identify triggers for eating and manage cues, and Eat slowly and take 20 to 30 minutes to complete each meal    Exercise Goals Reviewed and Discussed    Aim for 150 minutes moderate level exercise weekly with 2-3 days strength training as tolerated     ROS:    Constitutional: negative  Respiratory: negative  Cardiovascular: negative  Gastrointestinal: negative  Integument/breast: negative  Hematologic/lymphatic: negative  Musculoskeletal:negative  Neurological: positive for headaches  Behavioral/Psych: negative  Endocrine: negative  All other systems were reviewed and are negative    Physical Exam:  General appearance: alert, appears stated age, cooperative and obese  Head: Normocephalic, without obvious abnormality, atraumatic  Neck: no adenopathy, no carotid bruit, no JVD, supple, symmetrical, trachea midline and thyroid not enlarged, symmetric, no tenderness/mass/nodules  Lungs: clear to auscultation bilaterally  Heart: S1, S2 normal, no murmur, click, rub or gallop, regular rate and rhythm  Abdomen: soft, non-tender; bowel sounds normal; no masses,  no organomegaly and abdomen obese   Extremities: intact, no edema   Pulses: 2+ and symmetric  Skin: intact   Neurologic: Grossly normal        ASSESSMENT     Encounter Diagnosis(ses):   Encounter Diagnoses   Name Primary?    Encounter for therapeutic drug monitoring Yes    Obesity (BMI 30-39.9)     History of anemia     Snoring     Sleep apnea, unspecified type     Vitamin D deficiency        PLAN       Diagnoses and all orders for this visit:    Encounter for  therapeutic drug monitoring    Obesity (BMI 30-39.9)  -     semaglutide-weight management (WEGOVY) 0.25 MG/0.5ML Subcutaneous Solution Auto-injector; Inject 0.5 mL (0.25 mg total) into the skin once a week.    History of anemia    Snoring    Sleep apnea, unspecified type    Vitamin D deficiency          OBESITY/WEIGHT GAIN:     Recommended patient continue intensive lifestyle and behavioral modifications at this time for weight loss.     Reviewed lifestyle modifications: Whole Food/Plant Strong/Low Glycemic Index diet, moderate alcohol consumption, reduced sodium intake to no more than 2,400 mg/day, and at least 150 minutes of moderate physical activity per week.   Avoid processed, poor quality carbohydrates, refined grains, flour, sugar.     Goals for next month:  1. Keep a food log.  2. Drink 64 ounces of non-caloric beverages per day. No fruit juices or regular soda.  3. Aim for 150 minutes moderate exercise per week.    4. Increase fruit and vegetable servings to 5-6 per day.    5. Improve sleep and stress.      Reviewed labs:   1/15/24- Leptin, insulin elevated- fasting.  Vitamin D low- supplement weekly.  B 12, A1c, thyroid studies in range.  Abnormal hgb, iron level, ferritin- referred to hematologist   Sodium low- monitor.     Discussed medication options for weight loss in detail with patient.      Denies personal or family hx medullary thyroid CA, endocrine neoplasia syndrome, pancreatitis hx, suicidal ideation. No renal impairment, severe GI disease, diabetes, pancreatitis risks noted.     No Zepbound 2.5 mg weekly coverage.     Continue daily Qsymia 7.5-46 mg in the AM.    Will check Wegovy.   Start 0.25 mg weekly.  Increase dose monthly as tolerated.   Taper Qsymia during use.     Consider metformin as needed.      SQ administration teaching provided to patient.   Discussed risks, benefits, and side effects of medication.     EKG done 1/15/24.     Recommend sleep consult: planned 5/3/24.      Healthy  Plate Method.   Aim for 80 grams protein/day.  Consider RD as needed.      RTC 4 months.      SUSHILA Law

## 2024-08-26 ENCOUNTER — TELEPHONE (OUTPATIENT)
Dept: SURGERY | Facility: CLINIC | Age: 39
End: 2024-08-26

## 2024-09-05 DIAGNOSIS — E55.9 VITAMIN D DEFICIENCY: ICD-10-CM

## 2024-09-05 RX ORDER — FOLIC ACID 1 MG/1
1 TABLET ORAL
Qty: 4 CAPSULE | Refills: 3 | Status: SHIPPED | OUTPATIENT
Start: 2024-09-05

## 2024-09-05 RX ORDER — NORETHINDRONE AND ETHINYL ESTRADIOL 0.4-0.035
1 KIT ORAL DAILY
Qty: 84 TABLET | Refills: 1 | OUTPATIENT
Start: 2024-09-05

## 2024-09-05 NOTE — TELEPHONE ENCOUNTER
Requested Prescriptions     Pending Prescriptions Disp Refills    Norethindrone-Eth Estradiol 0.4-35 MG-MCG Oral Tab 84 tablet 1     Sig: Take 1 tablet by mouth daily.     Last annual 2/19/24  Last filled 2/19/24 x 6 mo  Pap UTD    Patient told to return in 3-4 mo for medication follow-up.  Ronyhart sent to patient to schedule.

## 2024-10-09 ENCOUNTER — TELEPHONE (OUTPATIENT)
Dept: SURGERY | Facility: CLINIC | Age: 39
End: 2024-10-09

## 2025-02-04 ENCOUNTER — OFFICE VISIT (OUTPATIENT)
Dept: OBGYN CLINIC | Facility: CLINIC | Age: 40
End: 2025-02-04

## 2025-02-04 VITALS
BODY MASS INDEX: 34 KG/M2 | DIASTOLIC BLOOD PRESSURE: 75 MMHG | HEART RATE: 73 BPM | SYSTOLIC BLOOD PRESSURE: 114 MMHG | WEIGHT: 215 LBS

## 2025-02-04 DIAGNOSIS — Z12.4 SCREENING FOR CERVICAL CANCER: ICD-10-CM

## 2025-02-04 DIAGNOSIS — Z12.31 VISIT FOR SCREENING MAMMOGRAM: ICD-10-CM

## 2025-02-04 DIAGNOSIS — Z01.419 ENCOUNTER FOR GYNECOLOGICAL EXAMINATION WITHOUT ABNORMAL FINDING: Primary | ICD-10-CM

## 2025-02-04 DIAGNOSIS — Z30.09 BIRTH CONTROL COUNSELING: ICD-10-CM

## 2025-02-04 PROCEDURE — 3078F DIAST BP <80 MM HG: CPT | Performed by: OBSTETRICS & GYNECOLOGY

## 2025-02-04 PROCEDURE — 3074F SYST BP LT 130 MM HG: CPT | Performed by: OBSTETRICS & GYNECOLOGY

## 2025-02-04 PROCEDURE — 99395 PREV VISIT EST AGE 18-39: CPT | Performed by: OBSTETRICS & GYNECOLOGY

## 2025-02-05 LAB
C TRACH DNA SPEC QL NAA+PROBE: NEGATIVE
HPV E6+E7 MRNA CVX QL NAA+PROBE: NEGATIVE
N GONORRHOEA DNA SPEC QL NAA+PROBE: NEGATIVE
T VAGINALIS RRNA SPEC QL NAA+PROBE: NEGATIVE

## 2025-02-06 NOTE — PROGRESS NOTES
Meli Sheridan is a 39 year old female  Patient's last menstrual period was 2025 (approximate).   Chief Complaint   Patient presents with    Gyn Exam     ANNUAL EXAM Reviewed Preventative/Wellness form with patient.     Consult     DISCUSS BIRTHCONTROL OPTIONS    .    OBSTETRICS HISTORY:     OB History    Para Term  AB Living   1 0 0 0 1 0   SAB IAB Ectopic Multiple Live Births   0 0 0 0 0      # Outcome Date GA Lbr Leonel/2nd Weight Sex Type Anes PTL Lv   1 AB                GYNE HISTORY:     Periods regular monthly      BCM:  Condoms    History   Sexual Activity    Sexual activity: Not on file        Hx Prior Abnormal Pap: No  Pap Date: 22  Pap Result Notes: NEG PAP / NEG HPV  Follow Up Recommendation: LAST ANNUAL 24 NJG          Latest Ref Rng & Units 2025    12:09 PM 2022     2:38 PM   RECENT PAP RESULTS   INTERPRETATION/RESULT: Negative for intraepithelial lesion or malignancy Negative for intraepithelial lesion or malignancy  Negative for intraepithelial lesion or malignancy    HPV Negative Negative  Negative          MEDICAL HISTORY:     No past medical history on file.  Past Surgical History:   Procedure Laterality Date    Oophorectomy  10/05/2020    laparascopic RSO for torsed large ov cyst     OB History    Para Term  AB Living   1 0 0 0 1 0   SAB IAB Ectopic Multiple Live Births   0 0 0 0 0        SOCIAL HISTORY:     Tobacco Use: Low Risk  (2025)    Patient History     Smoking Tobacco Use: Never     Smokeless Tobacco Use: Never     Passive Exposure: Not on file       FAMILY HISTORY:     Family History   Problem Relation Age of Onset    No Known Problems Mother     Diabetes Father          MEDICATIONS:       Current Outpatient Medications:     Norethindrone-Eth Estradiol 0.4-35 MG-MCG Oral Tab, Take 1 tablet by mouth daily., Disp: 84 tablet, Rfl: 1    Cholecalciferol (VITAMIN D3) 1.25 MG (06552 UT) Oral Cap, Take 1 capsule by mouth every 7  days., Disp: 4 capsule, Rfl: 3    semaglutide-weight management (WEGOVY) 0.25 MG/0.5ML Subcutaneous Solution Auto-injector, Inject 0.5 mL (0.25 mg total) into the skin once a week., Disp: 4 each, Rfl: 1    Phentermine-Topiramate (QSYMIA) 7.5-46 MG Oral Capsule SR 24 Hr, Take 1 capsule by mouth daily., Disp: 30 capsule, Rfl: 3    Ferrous Sulfate 325 (65 Fe) MG Oral Tab, TAKE 1 TABLET BY MOUTH TWICE A DAY FOR 1 MONTH, THEN 1 TIME A DAY FOR 2 MONTHS, Disp: 60 tablet, Rfl: 2    ALLERGIES:     Allergies[1]      REVIEW OF SYSTEMS:     Constitutional:    denies fever / chills  Eyes:     denies blurred or double vision  Cardiovascular:  denies chest pain or palpitations  Respiratory:    denies shortness of breath  Gastrointestinal:  denies severe abdominal pain, frequent diarrhea or constipation, nausea / vomiting  Genitourinary:    denies dysuria, bothersome incontinence  Skin/Breast:   denies any breast pain, lumps, or discharge  Neurological:    denies frequent severe headaches  Psychiatric:   denies depression or anxiety, thoughts of harming self or others  Heme/Lymph:    denies easy bruising or bleeding      PHYSICAL EXAM:   Blood pressure 114/75, pulse 73, weight 215 lb (97.5 kg), last menstrual period 01/17/2025.  Constitutional:  well developed, well nourished  Head/Face:  normocephalic  Neck/Thyroid: thyroid symmetric, no thyromegaly, no nodules, no adenopathy  Lymphatic: no abnormal supraclavicular or axillary adenopathy is noted  Breast:   normal without palpable masses, tenderness, asymmetry, nipple discharge, nipple retraction or skin changes  Abdomen:   soft, nontender, nondistended, no masses  Skin/Hair:  no unusual rashes or bruises  Extremities:  no edema, no cyanosis  Psychiatric:   oriented to time, place, person and situation. Appropriate mood and affect    Pelvic Exam:  External Genitalia:  normal appearance, hair distribution, and no lesions  Urethral Meatus:   normal in size, location, without lesions  and prolapse  Bladder:    no fullness, masses or tenderness  Vagina:    normal appearance without lesions, no abnormal discharge  Cervix:    normal without tenderness on motion  Uterus:    normal in size, contour, position, mobility, without tenderness  Adnexa:   normal without masses or tenderness  Perineum:   normal  Anus: no hemorroids         ASSESSMENT & PLAN:     Meli was seen today for gyn exam and consult.    Diagnoses and all orders for this visit:    Encounter for gynecological examination without abnormal finding  -     ThinPrep PAP Smear; Future  -     Hpv Dna  High Risk , Thin Prep Collect; Future  -     Trichomonas vaginalis, MERYL (Vaginal/Cervical); Future  -     Chlamydia/Gc Amplification; Future  -     Trichomonas vaginalis, MERYL (Vaginal/Cervical)  -     Hpv Dna  High Risk , Thin Prep Collect  -     Chlamydia/Gc Amplification  -     ThinPrep PAP Smear  -     THINPREP PAP SMEAR ONLY    Screening for cervical cancer  -     ThinPrep PAP Smear; Future  -     Hpv Dna  High Risk , Thin Prep Collect; Future  -     Hpv Dna  High Risk , Thin Prep Collect  -     ThinPrep PAP Smear  -     THINPREP PAP SMEAR ONLY    Birth control counseling    Visit for screening mammogram  -     Mammogram Screen 3D Digital Bilateral; Future    Other orders  -     Norethindrone-Eth Estradiol 0.4-35 MG-MCG Oral Tab; Take 1 tablet by mouth daily.    Reviewed options. Wishes to restart ocps. No issues w/ prior but did not continue after first 3 months. Ovcon 35 sent. Follow up in 3-4 mos for BP check    SUMMARY:  Pap: Next cotest today per ASCCP guidelines.  BCM:  Condoms  STD screening: GC/Chl/Trich only, declines blood STD screen, condoms encouraged  Mammogram: ordered placed   updated  Depression screen:   Depression Screening (PHQ-2/PHQ-9): Over the LAST 2 WEEKS   Little interest or pleasure in doing things: Not at all    Feeling down, depressed, or hopeless: Not at all    PHQ-2 SCORE: 0          FOLLOW-UP     Return for med  follow-up in 3-4 months.    Note to patient and family:  The 21st Century Cures Act makes medical notes available to patients in the interest of transparency.  However, please be advised that this is a medical document.  It is intended as a peer to peer communication.  It is written in medical language and may contain abbreviations or verbiage that are technical and unfamiliar.  It may appear blunt or direct.  Medical documents are intended to carry relevant information, facts as evident, and the clinical opinion of the practitioner.         [1] No Known Allergies

## 2025-02-20 ENCOUNTER — OFFICE VISIT (OUTPATIENT)
Dept: SURGERY | Facility: CLINIC | Age: 40
End: 2025-02-20
Payer: COMMERCIAL

## 2025-02-20 VITALS
SYSTOLIC BLOOD PRESSURE: 110 MMHG | DIASTOLIC BLOOD PRESSURE: 64 MMHG | HEIGHT: 66.5 IN | OXYGEN SATURATION: 97 % | BODY MASS INDEX: 34.33 KG/M2 | HEART RATE: 70 BPM | WEIGHT: 216.19 LBS

## 2025-02-20 DIAGNOSIS — E55.9 VITAMIN D DEFICIENCY: ICD-10-CM

## 2025-02-20 DIAGNOSIS — Z86.2 HISTORY OF ANEMIA: ICD-10-CM

## 2025-02-20 DIAGNOSIS — E66.9 OBESITY (BMI 30-39.9): ICD-10-CM

## 2025-02-20 DIAGNOSIS — G47.33 OSA (OBSTRUCTIVE SLEEP APNEA): Primary | ICD-10-CM

## 2025-02-20 DIAGNOSIS — Z51.81 ENCOUNTER FOR THERAPEUTIC DRUG MONITORING: ICD-10-CM

## 2025-02-20 DIAGNOSIS — R06.83 SNORING: ICD-10-CM

## 2025-02-20 PROCEDURE — 3074F SYST BP LT 130 MM HG: CPT | Performed by: NURSE PRACTITIONER

## 2025-02-20 PROCEDURE — 3008F BODY MASS INDEX DOCD: CPT | Performed by: NURSE PRACTITIONER

## 2025-02-20 PROCEDURE — 3078F DIAST BP <80 MM HG: CPT | Performed by: NURSE PRACTITIONER

## 2025-02-20 PROCEDURE — 99214 OFFICE O/P EST MOD 30 MIN: CPT | Performed by: NURSE PRACTITIONER

## 2025-02-20 RX ORDER — PHENTERMINE AND TOPIRAMATE 7.5; 46 MG/1; MG/1
1 CAPSULE, EXTENDED RELEASE ORAL DAILY
Qty: 30 CAPSULE | Refills: 3 | Status: SHIPPED | OUTPATIENT
Start: 2025-02-20

## 2025-02-20 RX ORDER — TIRZEPATIDE 2.5 MG/.5ML
2.5 INJECTION, SOLUTION SUBCUTANEOUS WEEKLY
Qty: 2 ML | Refills: 1 | Status: SHIPPED | OUTPATIENT
Start: 2025-02-20

## 2025-02-20 NOTE — PROGRESS NOTES
[facility], [place]  [address]  [place] [address]  Dept: [phone]       Patient:  Meli Sheridan  :      1985  MRN:      [id number]    Chief Complaint:    Chief Complaint   Patient presents with    Follow - Up     Restart medication    Weight Problem    Obesity       SUBJECTIVE     History of Present Illness:  Meli is being seen today for a follow-up for weight management.     HW since last visit 220 lbs after the holidays.     Ran out of medication 2024.     Initial HPI:  39 yo female.  Presents to clinic for assistance with weight loss/maintenance.   Reports slow/steady weight gain over the past 5 years.   Recently lost 20 lbs with lifestyle modifications.     Patient is considering medications and is not considering bariatric surgery for weight loss.    Patient denies any history of eating disorder(s).    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    Patient's goal weight: healthy   Biggest weight loss in the past: 20 lbs  How weight loss was achieved: lifestyle changes  Heaviest weight ever: 265 lbs   Previous use of medical weight loss medications: None     Physical activity: Crossfit 2-3 days/week    Sleep: interrupted hours/night    Sleep screening: +gasping, choking, snoring      Past Medical History: No past medical history on file.     Comorbidities:      OBJECTIVE     Vitals: /64 (BP Location: Right arm, Patient Position: Sitting, Cuff Size: large)   Pulse 70   Ht 5' 6.5\" (1.689 m)   Wt 216 lb 3.2 oz (98.1 kg)   LMP 2025 (Approximate)   SpO2 97%   BMI 34.37 kg/m²     Initial weight loss: -01   Total weight loss:  -29   Start weight: 245    Wt Readings from Last 6 Encounters:   25 216 lb 3.2 oz (98.1 kg)   25 215 lb (97.5 kg)   24 217 lb (98.4 kg)   24 231 lb 9.6 oz (105.1 kg)   24 224 lb (101.6 kg)   24 238 lb (108 kg)       Patient  Medications:    Current Outpatient Medications   Medication Sig Dispense Refill    Phentermine-Topiramate (QSYMIA) 7.5-46 MG Oral Capsule SR 24 Hr Take 1 capsule by mouth daily. 30 capsule 3    Cholecalciferol (VITAMIN D3) 1.25 MG (17763 UT) Oral Cap Take 1 capsule by mouth every 7 days. 4 capsule 3    Norethindrone-Eth Estradiol 0.4-35 MG-MCG Oral Tab Take 1 tablet by mouth daily. 84 tablet 1    Ferrous Sulfate 325 (65 Fe) MG Oral Tab TAKE 1 TABLET BY MOUTH TWICE A DAY FOR 1 MONTH, THEN 1 TIME A DAY FOR 2 MONTHS 60 tablet 2     Allergies:  Patient has no known allergies.     Social History:  Reviewed     Surgical History:    Past Surgical History:   Procedure Laterality Date    Oophorectomy  10/05/2020    laparascopic RSO for torsed large ov cyst     Family History:    Family History   Problem Relation Age of Onset    No Known Problems Mother     Diabetes Father      Initial Intake:  After dinner behavior: + ice cream bar, popcorn   Night eating: -  Portion sizes: -  Binge: -  Emotional: -  Depression: Score: 6  Grazing: +  Sweet tooth: +  Crunchy/salty: +  Etoh:  Drinks mostly water, macha tea, green tea   Soda Drinker: Yes                 If yes, how much?:  with FF   Sports Drinks:  No                  Juice:  No                     Number of restaurant or fast food meals/week:  2 meals/week    Food Journal  Reviewed and Discussed:       Patient has a Food Journal?: yes Cronometer   Patient is reading nutrition labels?  yes  Average Caloric Intake:     Average CHO Intake:   Is patient exercising? yes  Type of exercise? Crossfit 2-3 days/week    Eating Habits  Patient states the following:  Eats 2 meal(s) per day  Length of time it takes to consume a meal:    # of snacks per day:    Type of snacks:    Amount of soda consumption per day:  Rare with FF   Amount of water (in ounces) per day:  Adequate  Toughest challenge:      Protein smoothie replaced FF  Protein, produce, added avocado to breakfast   Aim for 80  grams protein/day  Aim for 30 grams fiber/day    Nutritional Goals  Eat 3-4 cups of fresh fruits or vegetables daily    Behavior Modifications Reviewed and Discussed  Eat breakfast, Eat 3 meals per day, Plan meals in advance, Read nutrition labels, Drink 64 oz of water per day, Maintain a daily food journal, Utlize portion control strategies to reduce calorie intake, Identify triggers for eating and manage cues, and Eat slowly and take 20 to 30 minutes to complete each meal    Exercise Goals Reviewed and Discussed    Aim for 150 minutes moderate level exercise weekly with 2-3 days strength training as tolerated     ROS:    Constitutional: negative  Respiratory: negative  Cardiovascular: negative  Gastrointestinal: negative  Integument/breast: negative  Hematologic/lymphatic: negative  Musculoskeletal:negative  Neurological: positive for headaches  Behavioral/Psych: negative  Endocrine: negative  All other systems were reviewed and are negative    Physical Exam:  General appearance: alert, appears stated age, cooperative and obese  Head: Normocephalic, without obvious abnormality, atraumatic  Neck: no adenopathy, no carotid bruit, no JVD, supple, symmetrical, trachea midline and thyroid not enlarged, symmetric, no tenderness/mass/nodules  Lungs: clear to auscultation bilaterally  Heart: S1, S2 normal, no murmur, click, rub or gallop, regular rate and rhythm  Abdomen: soft, non-tender; bowel sounds normal; no masses,  no organomegaly and abdomen obese   Extremities: intact, no edema   Pulses: 2+ and symmetric  Skin: intact   Neurologic: Grossly normal        ASSESSMENT     Encounter Diagnosis(ses):   Encounter Diagnoses   Name Primary?    PINA (obstructive sleep apnea) Yes    Encounter for therapeutic drug monitoring     Obesity (BMI 30-39.9)     History of anemia     Vitamin D deficiency     Snoring          PLAN       Diagnoses and all orders for this visit:    PINA (obstructive sleep apnea)    Encounter for  therapeutic drug monitoring  -     Iron And Tibc; Future  -     Ferritin; Future  -     CBC With Differential With Platelet; Future  -     Comp Metabolic Panel (14); Future  -     Vitamin D; Future  -     TSH and Free T4; Future  -     Lipid Panel; Future  -     EKG 12 Lead; Future    Obesity (BMI 30-39.9)  -     Iron And Tibc; Future  -     Ferritin; Future  -     CBC With Differential With Platelet; Future  -     Comp Metabolic Panel (14); Future  -     Vitamin D; Future  -     TSH and Free T4; Future  -     Lipid Panel; Future  -     Phentermine-Topiramate (QSYMIA) 7.5-46 MG Oral Capsule SR 24 Hr; Take 1 capsule by mouth daily.  -     EKG 12 Lead; Future    History of anemia  -     Iron And Tibc; Future  -     Ferritin; Future  -     CBC With Differential With Platelet; Future  -     Comp Metabolic Panel (14); Future  -     Vitamin D; Future  -     TSH and Free T4; Future  -     Lipid Panel; Future  -     EKG 12 Lead; Future    Vitamin D deficiency  -     Iron And Tibc; Future  -     Ferritin; Future  -     CBC With Differential With Platelet; Future  -     Comp Metabolic Panel (14); Future  -     Vitamin D; Future  -     TSH and Free T4; Future  -     Lipid Panel; Future  -     EKG 12 Lead; Future    Snoring  -     Iron And Tibc; Future  -     Ferritin; Future  -     CBC With Differential With Platelet; Future  -     Comp Metabolic Panel (14); Future  -     Vitamin D; Future  -     TSH and Free T4; Future  -     Lipid Panel; Future  -     EKG 12 Lead; Future      PINA: s/p home sleep study July 2024. Mild PINA. Plan pending. Cannot obtain CPAP to date. Recommend weight loss.     OBESITY/WEIGHT GAIN:     Recommended patient continue intensive lifestyle and behavioral modifications at this time for weight loss.     Reviewed lifestyle modifications: Whole Food/Plant Strong/Low Glycemic Index diet, moderate alcohol consumption, reduced sodium intake to no more than 2,400 mg/day, and at least 150 minutes of moderate  physical activity per week.   Avoid processed, poor quality carbohydrates, refined grains, flour, sugar.     Goals for next month:  1. Keep a food log.  2. Drink 64 ounces of non-caloric beverages per day. No fruit juices or regular soda.  3. Aim for 150 minutes moderate exercise per week.    4. Increase fruit and vegetable servings to 5-6 per day.    5. Improve sleep and stress.      Reviewed labs:   1/15/24- Leptin, insulin elevated- fasting.  Vitamin D low- supplement weekly.  B 12, a1c, thyroid studies in range.  Abnormal hgb, iron level, ferritin- referred to hematologist   Sodium low- monitor.  Update fasting labs at this time.      Discussed medication options for weight loss in detail with patient.      Denies personal or family hx medullary thyroid CA, endocrine neoplasia syndrome, pancreatitis hx, suicidal ideation. No renal impairment, severe GI disease, diabetes, pancreatitis risks noted.     No Zepbound or Wegovy coverage previously.   Will recheck Zepbound coverage under new PINA criteria.     Restart daily Qsymia 7.5-46 mg in the AM. (Medication break 11/2024- 2/2025).    Consider metformin next visit.      SQ administration teaching provided to patient.   Discussed risks, benefits, and side effects of medication.     EKG done 1/15/24.  Update EKG at this time.      S/p sleep consult.     Healthy Plate Method.   Aim for 80 grams protein/day.  Consider RD as needed.      RTC 4 months.      SUSHILA Law

## 2025-06-17 ENCOUNTER — OFFICE VISIT (OUTPATIENT)
Dept: OBGYN CLINIC | Facility: CLINIC | Age: 40
End: 2025-06-17

## 2025-06-17 VITALS
BODY MASS INDEX: 34 KG/M2 | DIASTOLIC BLOOD PRESSURE: 73 MMHG | HEART RATE: 76 BPM | SYSTOLIC BLOOD PRESSURE: 115 MMHG | WEIGHT: 215 LBS

## 2025-06-17 DIAGNOSIS — Z30.41 ORAL CONTRACEPTIVE PILL SURVEILLANCE: Primary | ICD-10-CM

## 2025-06-17 PROCEDURE — 3078F DIAST BP <80 MM HG: CPT | Performed by: OBSTETRICS & GYNECOLOGY

## 2025-06-17 PROCEDURE — 99212 OFFICE O/P EST SF 10 MIN: CPT | Performed by: OBSTETRICS & GYNECOLOGY

## 2025-06-17 PROCEDURE — 3074F SYST BP LT 130 MM HG: CPT | Performed by: OBSTETRICS & GYNECOLOGY

## 2025-06-17 NOTE — PROGRESS NOTES
The following individual(s) verbally consented to be recorded using ambient AI listening technology and understand that they can each withdraw their consent to this listening technology at any point by asking the clinician to turn off or pause the recording:    Patient name: Meli SOUZA Fatimah  Additional names:

## 2025-06-17 NOTE — PROGRESS NOTES
Meli Sheridan is a 40 year old female  Patient's last menstrual period was 2025 (exact date).   Chief Complaint   Patient presents with    Follow - Up     Birth control follow up -- doing well   .  History of Present Illness        OBSTETRICS HISTORY:  OB History    Para Term  AB Living   1 0 0 0 1 0   SAB IAB Ectopic Multiple Live Births   0 0 0 0 0       GYNE HISTORY:  Periods regular monthly    History   Sexual Activity    Sexual activity: Not on file       MEDICAL HISTORY:  No past medical history on file.  Past Surgical History:   Procedure Laterality Date    Oophorectomy  10/05/2020    laparascopic RSO for torsed large ov cyst     OB History    Para Term  AB Living   1 0 0 0 1 0   SAB IAB Ectopic Multiple Live Births   0 0 0 0 0        SOCIAL HISTORY:  Social History     Socioeconomic History    Marital status: Single     Spouse name: Not on file    Number of children: Not on file    Years of education: Not on file    Highest education level: Not on file   Occupational History    Not on file   Tobacco Use    Smoking status: Never    Smokeless tobacco: Never   Substance and Sexual Activity    Alcohol use: Yes     Comment: 2 drinks a week     Drug use: No    Sexual activity: Not on file   Other Topics Concern    Not on file   Social History Narrative    Not on file     Social Drivers of Health     Food Insecurity: Not on file   Transportation Needs: Not on file   Stress: Not on file   Housing Stability: Low Risk  (2021)    Received from [facility]    Housing Stability     Mortgage Payment Concerns?: Not on file     Number of Places Lived in the Last Year: Not on file     Unstable Housing?: Not on file       MEDICATIONS:    Current Outpatient Medications:     Norethindrone-Eth Estradiol 0.4-35 MG-MCG Oral Tab, Take 1 tablet by mouth daily., Disp: 84 tablet, Rfl: 2    Phentermine-Topiramate (QSYMIA) 7.5-46 MG Oral Capsule SR 24 Hr, Take 1 capsule  by mouth daily., Disp: 30 capsule, Rfl: 3    Tirzepatide-Weight Management (ZEPBOUND) 2.5 MG/0.5ML Subcutaneous Solution Auto-injector, Inject 2.5 mg into the skin once a week., Disp: 2 mL, Rfl: 1    Cholecalciferol (VITAMIN D3) 1.25 MG (83815 UT) Oral Cap, Take 1 capsule by mouth every 7 days., Disp: 4 capsule, Rfl: 3    Ferrous Sulfate 325 (65 Fe) MG Oral Tab, TAKE 1 TABLET BY MOUTH TWICE A DAY FOR 1 MONTH, THEN 1 TIME A DAY FOR 2 MONTHS, Disp: 60 tablet, Rfl: 2    ALLERGIES:  No Known Allergies      Review of Systems:  Constitutional:    denies fatigue, night sweats, hot flashes  Gastrointestinal:  denies abdominal pain, diarrhea or constipation  Genitourinary:    denies dysuria, abnormal vaginal discharge, vaginal itching  Musculoskeletal:   denies back pain.  Neurological:    denies headaches, extremity weakness or numbness.  Psychiatric:   denies depression or anxiety.        PHYSICAL EXAM:   /73   Pulse 76   Wt 215 lb (97.5 kg)   LMP 05/23/2025 (Exact Date)   BMI 34.18 kg/m²   Constitutional:  well developed, well nourished  Head/Face: normocephalic  Psychiatric:   oriented to time, place, person and situation. Appropriate mood and affect    Results      Assessment & Plan:    Meli was seen today for follow - up.    Diagnoses and all orders for this visit:    Oral contraceptive pill surveillance    Other orders  -     Norethindrone-Eth Estradiol 0.4-35 MG-MCG Oral Tab; Take 1 tablet by mouth daily.        Requested Prescriptions     Signed Prescriptions Disp Refills    Norethindrone-Eth Estradiol 0.4-35 MG-MCG Oral Tab 84 tablet 2     Sig: Take 1 tablet by mouth daily.     Assessment & Plan    Follow up in Feb for annual    Spent total time 10 minutes on obtaining history / chart review, evaluating patient / performing medically appropriate exam, discussing treatment options, counseling / educating, and completing documentation, coordinating care.

## 2025-06-26 ENCOUNTER — HOSPITAL ENCOUNTER (OUTPATIENT)
Dept: MAMMOGRAPHY | Facility: HOSPITAL | Age: 40
Discharge: HOME | End: 2025-06-26
Attending: OBSTETRICS & GYNECOLOGY
Payer: COMMERCIAL

## 2025-06-26 ENCOUNTER — OFFICE VISIT (OUTPATIENT)
Dept: SURGERY | Facility: CLINIC | Age: 40
End: 2025-06-26
Payer: COMMERCIAL

## 2025-06-26 VITALS
OXYGEN SATURATION: 97 % | SYSTOLIC BLOOD PRESSURE: 118 MMHG | DIASTOLIC BLOOD PRESSURE: 80 MMHG | HEART RATE: 71 BPM | BODY MASS INDEX: 34.46 KG/M2 | WEIGHT: 217 LBS | HEIGHT: 66.5 IN

## 2025-06-26 DIAGNOSIS — Z12.31 VISIT FOR SCREENING MAMMOGRAM: ICD-10-CM

## 2025-06-26 DIAGNOSIS — Z51.81 ENCOUNTER FOR THERAPEUTIC DRUG MONITORING: ICD-10-CM

## 2025-06-26 DIAGNOSIS — E55.9 VITAMIN D DEFICIENCY: ICD-10-CM

## 2025-06-26 DIAGNOSIS — Z86.2 HISTORY OF ANEMIA: ICD-10-CM

## 2025-06-26 DIAGNOSIS — R06.83 SNORING: ICD-10-CM

## 2025-06-26 DIAGNOSIS — E66.9 OBESITY (BMI 30-39.9): ICD-10-CM

## 2025-06-26 DIAGNOSIS — G47.33 OSA (OBSTRUCTIVE SLEEP APNEA): Primary | ICD-10-CM

## 2025-06-26 PROCEDURE — 3079F DIAST BP 80-89 MM HG: CPT | Performed by: NURSE PRACTITIONER

## 2025-06-26 PROCEDURE — 99214 OFFICE O/P EST MOD 30 MIN: CPT | Performed by: NURSE PRACTITIONER

## 2025-06-26 PROCEDURE — 77063 BREAST TOMOSYNTHESIS BI: CPT | Performed by: OBSTETRICS & GYNECOLOGY

## 2025-06-26 PROCEDURE — 3074F SYST BP LT 130 MM HG: CPT | Performed by: NURSE PRACTITIONER

## 2025-06-26 PROCEDURE — 77067 SCR MAMMO BI INCL CAD: CPT | Performed by: OBSTETRICS & GYNECOLOGY

## 2025-06-26 PROCEDURE — 3008F BODY MASS INDEX DOCD: CPT | Performed by: NURSE PRACTITIONER

## 2025-06-26 RX ORDER — PHENTERMINE AND TOPIRAMATE 7.5; 46 MG/1; MG/1
1 CAPSULE, EXTENDED RELEASE ORAL DAILY
Qty: 30 CAPSULE | Refills: 3 | Status: SHIPPED | OUTPATIENT
Start: 2025-06-26

## 2025-06-26 NOTE — PROGRESS NOTES
[facility], [facility], [place]  [address]  [facility] [address]  Dept: [phone]       Patient:  Meli Sheridan  :      1985  MRN:      [id number]    Chief Complaint:    Chief Complaint   Patient presents with    Follow - Up    Weight Management    Obesity       SUBJECTIVE     History of Present Illness:  Meli is being seen today for a follow-up for weight management.     Shipment of Qsymia was delayed for one month.   Resumed medication today.     No Zepbound coverage.     Initial HPI:  37 yo female.  Presents to clinic for assistance with weight loss/maintenance.   Reports slow/steady weight gain over the past 5 years.   Recently lost 20 lbs with lifestyle modifications.     Patient is considering medications and is not considering bariatric surgery for weight loss.    Patient denies any history of eating disorder(s).    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    Patient's goal weight: healthy   Biggest weight loss in the past: 20 lbs  How weight loss was achieved: lifestyle changes  Heaviest weight ever: 265 lbs   Previous use of medical weight loss medications: None     Physical activity: Crossfit 2-3 days/week    Sleep: interrupted hours/night    Sleep screening: +gasping, choking, snoring      Past Medical History: History reviewed. No pertinent past medical history.     Comorbidities:      OBJECTIVE     Vitals: /80 (BP Location: Right arm, Patient Position: Sitting, Cuff Size: large)   Pulse 71   Ht 5' 6.5\" (1.689 m)   Wt 217 lb (98.4 kg)   LMP 2025 (Exact Date)   SpO2 97%   BMI 34.50 kg/m²     Initial weight loss: +01   Total weight loss:  -28   Start weight: 245    Wt Readings from Last 6 Encounters:   25 217 lb (98.4 kg)   25 215 lb (97.5 kg)   25 216 lb 3.2 oz (98.1 kg)   25 215 lb (97.5 kg)   24 217 lb (98.4 kg)   24 231 lb 9.6 oz (105.1 kg)       Patient  Medications:    Current Outpatient Medications   Medication Sig Dispense Refill    Phentermine-Topiramate ER (QSYMIA) 7.5-46 MG Oral Capsule SR 24 Hr Take 1 capsule by mouth daily. 30 capsule 3    Norethindrone-Eth Estradiol 0.4-35 MG-MCG Oral Tab Take 1 tablet by mouth daily. 84 tablet 2    Cholecalciferol (VITAMIN D3) 1.25 MG (65093 UT) Oral Cap Take 1 capsule by mouth every 7 days. (Patient not taking: Reported on 6/26/2025) 4 capsule 3    Ferrous Sulfate 325 (65 Fe) MG Oral Tab TAKE 1 TABLET BY MOUTH TWICE A DAY FOR 1 MONTH, THEN 1 TIME A DAY FOR 2 MONTHS 60 tablet 2     Allergies:  Patient has no known allergies.     Social History:  Reviewed     Surgical History:    Past Surgical History:   Procedure Laterality Date    Oophorectomy  10/05/2020    laparascopic RSO for torsed large ov cyst     Family History:    Family History   Problem Relation Age of Onset    No Known Problems Mother     Diabetes Father     Breast Cancer Neg      Initial Intake:  After dinner behavior: + ice cream bar, popcorn   Night eating: -  Portion sizes: -  Binge: -  Emotional: -  Depression: Score: 6  Grazing: +  Sweet tooth: +  Crunchy/salty: +  Etoh:  Drinks mostly water, macha tea, green tea   Soda Drinker: Yes                 If yes, how much?:  with FF   Sports Drinks:  No                  Juice:  No                     Number of restaurant or fast food meals/week:  2 meals/week    Food Journal  Reviewed and Discussed:       Patient has a Food Journal?: yes Cronometer   Patient is reading nutrition labels?  yes  Average Caloric Intake:     Average CHO Intake:   Is patient exercising? yes  Type of exercise? Crossfit 2-3 days/week    Eating Habits  Patient states the following:  Eats 2 meal(s) per day  Length of time it takes to consume a meal:    # of snacks per day:    Type of snacks:    Amount of soda consumption per day:    Amount of water (in ounces) per day:  Adequate  Toughest challenge:  leaving to [facility] 1 month    More  FF  Protein, produce, added avocado to breakfast   Aim for 80 grams protein/day  Aim for 30 grams fiber/day    Nutritional Goals  Eat 3-4 cups of fresh fruits or vegetables daily    Behavior Modifications Reviewed and Discussed  Eat breakfast, Eat 3 meals per day, Plan meals in advance, Read nutrition labels, Drink 64 oz of water per day, Maintain a daily food journal, Utlize portion control strategies to reduce calorie intake, Identify triggers for eating and manage cues, and Eat slowly and take 20 to 30 minutes to complete each meal    Exercise Goals Reviewed and Discussed    Aim for 150 minutes moderate level exercise weekly with 2-3 days strength training as tolerated     ROS:    Constitutional: negative  Respiratory: negative  Cardiovascular: negative  Gastrointestinal: negative  Integument/breast: negative  Hematologic/lymphatic: negative  Musculoskeletal:negative  Neurological: positive for headaches  Behavioral/Psych: negative  Endocrine: negative  All other systems were reviewed and are negative    Physical Exam:  General appearance: alert, appears stated age, cooperative and obese  Head: Normocephalic, without obvious abnormality, atraumatic  Neck: no adenopathy, no carotid bruit, no JVD, supple, symmetrical, trachea midline and thyroid not enlarged, symmetric, no tenderness/mass/nodules  Lungs: clear to auscultation bilaterally  Heart: S1, S2 normal, no murmur, click, rub or gallop, regular rate and rhythm  Abdomen: soft, non-tender; bowel sounds normal; no masses,  no organomegaly and abdomen obese   Extremities: intact, no edema   Pulses: 2+ and symmetric  Skin: intact   Neurologic: Grossly normal        ASSESSMENT     Encounter Diagnosis(ses):   Encounter Diagnoses   Name Primary?    PINA (obstructive sleep apnea) Yes    Encounter for therapeutic drug monitoring     Obesity (BMI 30-39.9)     History of anemia     Vitamin D deficiency     Snoring            PLAN       Diagnoses and all orders for this  visit:    PINA (obstructive sleep apnea)    Encounter for therapeutic drug monitoring    Obesity (BMI 30-39.9)  -     Phentermine-Topiramate ER (QSYMIA) 7.5-46 MG Oral Capsule SR 24 Hr; Take 1 capsule by mouth daily.    History of anemia    Vitamin D deficiency    Snoring        PINA: s/p home sleep study July 2024. Mild PINA. Recommend weight loss.     OBESITY/WEIGHT GAIN:     Recommended patient continue intensive lifestyle and behavioral modifications at this time for weight loss.     Reviewed lifestyle modifications: Whole Food/Plant Strong/Low Glycemic Index diet, moderate alcohol consumption, reduced sodium intake to no more than 2,400 mg/day, and at least 150 minutes of moderate physical activity per week.   Avoid processed, poor quality carbohydrates, refined grains, flour, sugar.     Goals for next month:  1. Keep a food log.  2. Drink 64 ounces of non-caloric beverages per day. No fruit juices or regular soda.  3. Aim for 150 minutes moderate exercise per week.    4. Increase fruit and vegetable servings to 5-6 per day.    5. Improve sleep and stress.      Reviewed labs:   1/15/24- Leptin, insulin elevated- fasting.  Vitamin D low- supplement weekly.  B 12, a1c, thyroid studies in range.  Abnormal hgb, iron level, ferritin- referred to hematologist   Sodium low- monitor.  Update fasting labs at this time.      Discussed medication options for weight loss in detail with patient.      Denies personal or family hx medullary thyroid CA, endocrine neoplasia syndrome, pancreatitis hx, suicidal ideation. No renal impairment, severe GI disease, diabetes, pancreatitis risks noted.     No Zepbound or Wegovy coverage previously.   Declines OOP option.      Continue daily Qsymia 7.5-46 mg in the AM. (Medication break 11/2024- 2/2025 and May-June 2025).    Consider metformin.     SQ administration teaching provided to patient.   Discussed risks, benefits, and side effects of medication.     EKG done 1/15/24.  Update EKG  at this time.      S/p sleep consult.     Healthy Plate Method.   Aim for 80 grams protein/day.  Consider RD as needed.      RTC 4 months.      Savita Potts APRN

## (undated) DEVICE — ENCORE® LATEX ACCLAIM SIZE 8, STERILE LATEX POWDER-FREE SURGICAL GLOVE: Brand: ENCORE

## (undated) DEVICE — SPCMN DTCHBLE POUCH 5X7 500ML

## (undated) DEVICE — ENCORE® LATEX MICRO SIZE 8.5, STERILE LATEX POWDER-FREE SURGICAL GLOVE: Brand: ENCORE

## (undated) DEVICE — SUTURE VICRYL 0 UR-6

## (undated) DEVICE — [HIGH FLOW INSUFFLATOR,  DO NOT USE IF PACKAGE IS DAMAGED,  KEEP DRY,  KEEP AWAY FROM SUNLIGHT,  PROTECT FROM HEAT AND RADIOACTIVE SOURCES.]: Brand: PNEUMOSURE

## (undated) DEVICE — 3M™ STERI-STRIP™ REINFORCED ADHESIVE SKIN CLOSURES, R1547, 1/2 IN X 4 IN (12 MM X 100 MM), 6 STRIPS/ENVELOPE: Brand: 3M™ STERI-STRIP™

## (undated) DEVICE — TROCAR: Brand: KII SLEEVE

## (undated) DEVICE — Device

## (undated) DEVICE — LAPAROSCOPY: Brand: MEDLINE INDUSTRIES, INC.

## (undated) DEVICE — DERMABOND LIQUID ADHESIVE

## (undated) DEVICE — ADHESIVE MASTISOL 2/3CC VL

## (undated) DEVICE — SOL  .9 1000ML BTL

## (undated) DEVICE — SUTURE MONOCRYL 4-0 PS-2

## (undated) DEVICE — LAP LIGASURE 5MM BLUNT

## (undated) DEVICE — TROCAR: Brand: KII FIOS FIRST ENTRY

## (undated) DEVICE — SOL  .9 3000ML

## (undated) DEVICE — INSUFFLATION NEEDLE TO ESTABLISH PNEUMOPERITONEUM.: Brand: INSUFFLATION NEEDLE

## (undated) NOTE — LETTER
[address]  [address] [place], [place]      Authorization for Surgical Operation and Procedure     Date:___________                                                                                                         Time:_______ 4.   Should the need arise during my operation or immediate post-operative period, I also consent to the administration of blood and/or blood products.   Further, I understand that despite careful testing and screening of blood or blood products by robert 8.   I recognize that in the event my procedure results in extended X-Ray/fluoroscopy time, I may develop a skin reaction. 9.  If I have a Do Not Attempt Resuscitation (DNAR) order in place, that status will be suspended while in the operating room, proc STATEMENT OF PHYSICIAN My signature below affirms that prior to the time of the procedure; I have explained to the patient and/or his/her legal representative, the risks and benefits involved in the proposed treatment and any reasonable alternative to the

## (undated) NOTE — Clinical Note
2/1/2017          To Whom It May Concern:    Rosy Bauer is currently under my medical care and may not return to work at this time. Please excuse Minna Galvin for 1 days. She may return to work on 2/2/2017.   .    If you require additional information shai

## (undated) NOTE — LETTER
[address] [place] [address]      Authorization for Surgical Operation and Procedure     Date:___________                                                                                                         Time:_______ 4.   Should the need arise during my operation or immediate post-operative period, I also consent to the administration of blood and/or blood products.   Further, I understand that despite careful testing and screening of blood or blood products by robert 8.   I recognize that in the event my procedure results in extended X-Ray/fluoroscopy time, I may develop a skin reaction. 9.  If I have a Do Not Attempt Resuscitation (DNAR) order in place, that status will be suspended while in the operating room, proc STATEMENT OF PHYSICIAN My signature below affirms that prior to the time of the procedure; I have explained to the patient and/or his/her legal representative, the risks and benefits involved in the proposed treatment and any reasonable alternative to the

## (undated) NOTE — LETTER
[facility] [address], [place], [place]  [address]  INFORMED CONSENT FOR TRANSFUSION OF BLOOD OR BLOOD PRODUCTS  My physician has informed me of the nature, purpose, benefits and risks of transfusion for blood and blood components that __________________________________________          ______________________________________________  (Signature of Patient)                                                            (Responsible party in case of Minor,

## (undated) NOTE — LETTER
[facility]  Administration of Anesthesia  1. Jasson Mcdonoughar, or _________________________________ acting on her behalf, (Patient) (Dependent/Representative) request to receive anesthesia for my pending procedure/operation/treatment.   A 6. OBSTETRIC PATIENTS: Specific risks/consequences of spinal/epidural anesthesia may include itching, low blood pressure, difficulty urinating, slowing of the baby's heart rate and headache.  Rare risks include infections, high spinal block, spinal bleeding ___________________________________________________           _____________________________________________________  Date/Time                                                                                               Responsible person in case of minor

## (undated) NOTE — MR AVS SNAPSHOT
[address] [facility] [address]  [phone]               Thank you for choosing us for your health care visit with Misael Zimmerman MD.  We are glad to serve you and happy to provide you with this summary of your visi evaluation may include a health history, physical exam, and diagnostic tests. Health history  Your healthcare provider may ask you the following:  · How long has the sore throat lasted and how have you been treating it?   · Do you have any other symptoms, · Try over-the-counter pain relievers such as acetaminophen or ibuprofen. Use as directed, and don’t exceed the recommended dose. Don’t give aspirin to children. Are antibiotics needed?   If your sore throat is due to a bacterial infection, antibiotics ma · Symptoms don’t improve within 2 to 3 days of starting antibiotics. Date Last Reviewed: 10/1/2016  © 4065-2127 [address], [address] [place] [address]. All rights reserved.  This information is not intended as a substitute fo Don’t eat while when you’re bored.      EAT THESE FOODS MORE OFTEN: EAT THESE FOODS LESS OFTEN:   Make half your plate fruits and vegetables Highly refined, white starches including white bread, rice and pasta   Eat plenty of protein, keep the fat content l